# Patient Record
Sex: MALE | Race: WHITE | NOT HISPANIC OR LATINO | Employment: STUDENT | URBAN - METROPOLITAN AREA
[De-identification: names, ages, dates, MRNs, and addresses within clinical notes are randomized per-mention and may not be internally consistent; named-entity substitution may affect disease eponyms.]

---
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
## 2021-09-29 ENCOUNTER — OFFICE VISIT (OUTPATIENT)
Dept: INTERNAL MEDICINE | Facility: CLINIC | Age: 21
End: 2021-09-29

## 2021-09-29 VITALS
HEIGHT: 70 IN | WEIGHT: 213.8 LBS | DIASTOLIC BLOOD PRESSURE: 80 MMHG | OXYGEN SATURATION: 98 % | BODY MASS INDEX: 30.61 KG/M2 | SYSTOLIC BLOOD PRESSURE: 124 MMHG | HEART RATE: 92 BPM

## 2021-09-29 DIAGNOSIS — R52 BODY ACHES: Primary | ICD-10-CM

## 2021-09-29 DIAGNOSIS — H65.01 NON-RECURRENT ACUTE SEROUS OTITIS MEDIA OF RIGHT EAR: ICD-10-CM

## 2021-09-29 LAB
EXPIRATION DATE: NORMAL
FLUAV AG NPH QL: NEGATIVE
FLUBV AG NPH QL: NEGATIVE
INTERNAL CONTROL: NORMAL
Lab: 2780

## 2021-09-29 PROCEDURE — 87804 INFLUENZA ASSAY W/OPTIC: CPT

## 2021-09-29 PROCEDURE — 99203 OFFICE O/P NEW LOW 30 MIN: CPT

## 2021-09-29 PROCEDURE — U0004 COV-19 TEST NON-CDC HGH THRU: HCPCS

## 2021-09-29 RX ORDER — FLUTICASONE PROPIONATE 50 MCG
2 SPRAY, SUSPENSION (ML) NASAL DAILY
Qty: 16 G | Refills: 2 | Status: SHIPPED | OUTPATIENT
Start: 2021-09-29 | End: 2022-03-07

## 2021-09-29 RX ORDER — AMOXICILLIN AND CLAVULANATE POTASSIUM 875; 125 MG/1; MG/1
1 TABLET, FILM COATED ORAL 2 TIMES DAILY
Qty: 14 TABLET | Refills: 0 | Status: SHIPPED | OUTPATIENT
Start: 2021-09-29 | End: 2021-10-08

## 2021-09-29 NOTE — PROGRESS NOTES
Chief Complaint  Establish Care, Generalized Body Aches (for about 7 days), Fatigue, and Earache (right)    Justice Pierce presents to [facility]    Previous PCP: None     HPI: Body aches & fatigue x 6 days. Body aches are generalized and occur intermittently. Has been taking ibuprofen and acetaminophen each day with some moderate improvement. Pain is rated as an 8 out of 10. Completed COVID-19 vaccination series in April.     Right ear pain x 2 days. Symptoms have worsened as of this morning. Right ear feels achy and sharp. Reports that the ibuprofen and acetaminophen he has been taking for his body aches have helped with the ear pain. States that the pain is an 8 out of 10.       Subjective         Health Maintenance   Topic   • ANNUAL PHYSICAL    • HPV VACCINES (1 - Male 2-dose series)   • TDAP/TD VACCINES (1 - Tdap)   • HEPATITIS C SCREENING    • INFLUENZA VACCINE    • COVID-19 Vaccine    • Pneumococcal Vaccine 0-64    • MENINGOCOCCAL VACCINE        [facility]  The following portions of the patient's history were reviewed and updated as appropriate: allergies, current medications, past family history, past medical history, past social history, past surgical history and problem list.     History reviewed. No pertinent past medical history.   No Known Allergies   Social History     Tobacco Use   • Smoking status: Never Smoker   • Smokeless tobacco: Never Used   Substance Use Topics   • Alcohol use: Never   • Drug use: Never     History reviewed. No pertinent surgical history.   Family History   Problem Relation Age of Onset   • Liver disease Mother    • Cancer Mother    • Migraines Father    • Migraines Sister          Current Outpatient Medications:   •  amoxicillin-clavulanate (Augmentin) 875-125 MG per tablet, Take 1 tablet by mouth 2 (Two) Times a Day., Disp: 14 tablet, Rfl: 0  •  fluticasone (Flonase) 50 MCG/ACT nasal spray, 2 sprays into the nostril(s) as directed by provider  "Daily., Disp: 16 g, Rfl: 2    Review of Systems   Constitutional: Positive for fatigue. Negative for chills and fever.   HENT: Positive for ear pain. Negative for congestion, dental problem, drooling, ear discharge, facial swelling, hearing loss, mouth sores, nosebleeds, postnasal drip, rhinorrhea, sinus pressure, sneezing, sore throat, swollen glands, tinnitus, trouble swallowing and voice change.    Respiratory: Negative for apnea, cough, choking, chest tightness, shortness of breath, wheezing and stridor.    Cardiovascular: Negative for chest pain, palpitations and leg swelling.   Gastrointestinal: Negative for abdominal distention, abdominal pain, anal bleeding, blood in stool, constipation, diarrhea, nausea, rectal pain, vomiting, GERD and indigestion.   Musculoskeletal: Positive for arthralgias and myalgias. Negative for back pain, gait problem, joint swelling, neck pain, neck stiffness and bursitis.   Skin: Negative for color change, dry skin, pallor, rash, skin lesions and bruise.       Objective   Vital Signs  /80   Pulse 92   Ht 177.8 cm (70\")   Wt 97 kg (213 lb 12.8 oz)   SpO2 98% Comment: ra  BMI 30.68 kg/m²     Physical Exam  Constitutional:       Appearance: Normal appearance.   HENT:      Head: Normocephalic.      Right Ear: Hearing, ear canal and external ear normal. No decreased hearing noted. No laceration, drainage, swelling or tenderness. No middle ear effusion. There is no impacted cerumen. No foreign body. No mastoid tenderness. No PE tube. No hemotympanum. Tympanic membrane is erythematous and bulging. Tympanic membrane is not injected, scarred, perforated or retracted. Tympanic membrane has decreased mobility.      Left Ear: Hearing, tympanic membrane, ear canal and external ear normal.      Nose:      Right Turbinates: Swollen and pale.      Left Turbinates: Swollen and pale.      Mouth/Throat:      Lips: No lesions.      Mouth: Mucous membranes are moist.      Pharynx: Oropharynx " is clear.   Eyes:      Extraocular Movements: Extraocular movements intact.      Conjunctiva/sclera: Conjunctivae normal.      Pupils: Pupils are equal, round, and reactive to light.   Cardiovascular:      Rate and Rhythm: Normal rate and regular rhythm.      Pulses: Normal pulses.      Heart sounds: Normal heart sounds.   Pulmonary:      Effort: Pulmonary effort is normal.      Breath sounds: Normal breath sounds.   Abdominal:      General: Bowel sounds are normal.      Palpations: Abdomen is soft.   Skin:     General: Skin is warm and dry.      Capillary Refill: Capillary refill takes less than 2 seconds.   Neurological:      Mental Status: He is alert and oriented to person, place, and time.   Psychiatric:         Mood and Affect: Mood normal.         Thought Content: Thought content normal.         Judgment: Judgment normal.          Result Review :     The following data was reviewed by: NONA Powers on 09/29/2021:    Influenza negative in office    Assessment and Plan    1. Body aches  - POC Influenza A / B  - COVID-19 PCR, [facility], NP SWAB IN [facility]SPORT MEDIA/ORAL SWISH 24-30 HR TAT - Swab, Nasopharynx  - Influenza negative in office.   - Encouraged patient to self-quarantine until results are made available.  - Encouraged adequate rest, humidification, and increase clear fluid intake.   - Encouraged alternating use of acetaminophen and Ibuprofen every 4-6 hours as needed for headache, body aches, and/or fever reduction    2. Non-recurrent acute serous otitis media of right ear  - amoxicillin-clavulanate (Augmentin) 875-125 MG per tablet; Take 1 tablet by mouth 2 (Two) Times a Day.  Dispense: 14 tablet; Refill: 0  - fluticasone (Flonase) 50 MCG/ACT nasal spray; 2 sprays into the nostril(s) as directed by provider Daily.  Dispense: 16 g; Refill: 2      Follow Up     Return in about 4 weeks (around 10/27/2021) for Annual.    Patient was given instructions and counseling regarding his  condition or for health maintenance advice. Please see specific information pulled into the AVS if appropriate.    Part of this note may be an electronic transcription/translation of spoken language to printed text using the Dragon Dictation System.    Electronically signed by:   NONA Powers  09/29/2021

## 2021-09-30 LAB — SARS-COV-2 RNA NOSE QL NAA+PROBE: NOT DETECTED

## 2021-10-08 ENCOUNTER — OFFICE VISIT (OUTPATIENT)
Dept: INTERNAL MEDICINE | Facility: CLINIC | Age: 21
End: 2021-10-08

## 2021-10-08 VITALS
TEMPERATURE: 97.3 F | DIASTOLIC BLOOD PRESSURE: 64 MMHG | HEIGHT: 70 IN | SYSTOLIC BLOOD PRESSURE: 98 MMHG | HEART RATE: 76 BPM | BODY MASS INDEX: 30.35 KG/M2 | OXYGEN SATURATION: 98 % | WEIGHT: 212 LBS

## 2021-10-08 DIAGNOSIS — J30.9 ALLERGIC RHINITIS, UNSPECIFIED SEASONALITY, UNSPECIFIED TRIGGER: ICD-10-CM

## 2021-10-08 DIAGNOSIS — J02.9 SORE THROAT: Primary | ICD-10-CM

## 2021-10-08 LAB
EXPIRATION DATE: NORMAL
INTERNAL CONTROL: NORMAL
Lab: NORMAL
S PYO AG THROAT QL: NEGATIVE

## 2021-10-08 PROCEDURE — 87880 STREP A ASSAY W/OPTIC: CPT

## 2021-10-08 PROCEDURE — 99213 OFFICE O/P EST LOW 20 MIN: CPT

## 2021-10-08 RX ORDER — METHYLPREDNISOLONE 4 MG/1
TABLET ORAL
Qty: 21 TABLET | Refills: 0 | Status: SHIPPED | OUTPATIENT
Start: 2021-10-08 | End: 2021-10-13

## 2021-10-08 RX ORDER — CETIRIZINE HYDROCHLORIDE 10 MG/1
10 TABLET ORAL DAILY
Qty: 30 TABLET | Refills: 5 | Status: SHIPPED | OUTPATIENT
Start: 2021-10-08 | End: 2022-06-29

## 2021-10-08 NOTE — PROGRESS NOTES
Chief Complaint  Earache (Left), Sore Throat, and Nasal Congestion    Justice Pierce presents to [facility]    HPI: Was treated for AOM of the left ear with Augmentin on 9/29. Reports that symptoms improved after about 3 days of antibiotic therapy. No longer experiencing body aches. Reports that he is still experiencing nasal congestion, sore throat, and earache on the left. Sore throat is described as scratchy and achy.  Ear pain is described as full and achy. Has been using fluticasone with mild relief in symptoms. Reports that symptoms get worse when he comes home from college and comes to his parents who have a cat and dog. Ear pain is rated as a 6 out of 10.     Subjective         Health Maintenance   Topic   • ANNUAL PHYSICAL    • HPV VACCINES (1 - Male 2-dose series)   • TDAP/TD VACCINES (1 - Tdap)   • INFLUENZA VACCINE    • HEPATITIS C SCREENING    • COVID-19 Vaccine    • Pneumococcal Vaccine 0-64    • MENINGOCOCCAL VACCINE        [facility]  The following portions of the patient's history were reviewed and updated as appropriate: allergies, current medications, past family history, past medical history, past social history, past surgical history and problem list.     No past medical history on file.   No Known Allergies   Social History     Tobacco Use   • Smoking status: Never Smoker   • Smokeless tobacco: Never Used   Substance Use Topics   • Alcohol use: Never   • Drug use: Never     No past surgical history on file.   Family History   Problem Relation Age of Onset   • Liver disease Mother    • Cancer Mother    • Migraines Father    • Migraines Sister          Current Outpatient Medications:   •  fluticasone (Flonase) 50 MCG/ACT nasal spray, 2 sprays into the nostril(s) as directed by provider Daily., Disp: 16 g, Rfl: 2  •  cetirizine (zyrTEC) 10 MG tablet, Take 1 tablet by mouth Daily., Disp: 30 tablet, Rfl: 5  •  methylPREDNISolone (MEDROL) 4 MG dose pack, Take as directed  "on package instructions., Disp: 21 tablet, Rfl: 0    Review of Systems   HENT: Positive for congestion, ear pain, postnasal drip and sore throat. Negative for dental problem, drooling, ear discharge, facial swelling, hearing loss, mouth sores, nosebleeds, rhinorrhea, sinus pressure, sneezing, swollen glands, tinnitus, trouble swallowing and voice change.    Respiratory: Negative for apnea, cough, choking, chest tightness, shortness of breath, wheezing and stridor.    Cardiovascular: Negative for chest pain, palpitations and leg swelling.       Objective   Vital Signs  BP 98/64   Pulse 76   Temp 97.3 °F (36.3 °C)   Ht 177.8 cm (70\")   Wt 96.2 kg (212 lb)   SpO2 98%   BMI 30.42 kg/m²     Physical Exam  Constitutional:       Appearance: Normal appearance.   HENT:      Head: Normocephalic.      Right Ear: Hearing, tympanic membrane, ear canal and external ear normal.      Left Ear: Hearing, ear canal and external ear normal. A middle ear effusion is present.      Nose:      Right Turbinates: Swollen and pale.      Left Turbinates: Swollen and pale.      Mouth/Throat:      Mouth: Mucous membranes are moist.      Pharynx: Oropharynx is clear.   Eyes:      General: Allergic shiner present.      Extraocular Movements: Extraocular movements intact.      Conjunctiva/sclera: Conjunctivae normal.      Pupils: Pupils are equal, round, and reactive to light.   Cardiovascular:      Rate and Rhythm: Normal rate and regular rhythm.      Pulses: Normal pulses.      Heart sounds: Normal heart sounds.   Pulmonary:      Effort: Pulmonary effort is normal.      Breath sounds: Normal breath sounds.   Skin:     General: Skin is warm and dry.      Capillary Refill: Capillary refill takes less than 2 seconds.   Neurological:      Mental Status: He is alert and oriented to person, place, and time.   Psychiatric:         Mood and Affect: Mood normal.         Behavior: Behavior normal.         Thought Content: Thought content normal.    "      Judgment: Judgment normal.          Result Review :     The following data was reviewed by: NONA Powers on 10/08/2021:     Strep test negative in office.     Assessment and Plan    1. Sore throat  - POCT rapid strep A  - Strep negative in office.   - Encouraged prn salt water gargles for symptomatic relief.   2. Allergic rhinitis, unspecified seasonality, unspecified trigger  - cetirizine (zyrTEC) 10 MG tablet; Take 1 tablet by mouth Daily.  Dispense: 30 tablet; Refill: 5  - methylPREDNISolone (MEDROL) 4 MG dose pack; Take as directed on package instructions.  Dispense: 21 tablet; Refill: 0  - Discussed how symptoms are likely due to allergies r/t pet dander.   - Continue with fluticasone propionate 2 sprays per nostril once daily; after the first few days dosage may be reduced to 1 spray per nostril once daily for maintenance.  Squirt nasal spray just inside the nostril aiming toward the outer walls on both sides.  Do not take a deep breath or sniff hard after spraying as this will result in the medication draining down the throat.    - Initiate zyrtec once per day as prescribed.   - Encouraged patient to avoid dust mites, pollens, animal dander, cockroaches, indoor molds, rodents, and upholstery.    - Discussed if removing a pet from home is not feasible, isolating the pet to one room in the house may be an option to minimize dander exposure.  It may take up to 20 weeks eliminate cat dander from home even after removing the animal.    - Encouraged the use of allergen impermeable bedding covers, washing sheets in hot water, and the use of a vacuum  with high efficiency particulate air (HEPA) filters may also lessen symptoms. Take a shower before bed to rinse pollen off the hair and skin.       Follow Up     Return for Next scheduled follow up.    Patient was given instructions and counseling regarding his condition or for health maintenance advice. Please see specific information pulled into the  AVS if appropriate.    Part of this note may be an electronic transcription/translation of spoken language to printed text using the Dragon Dictation System.    Electronically signed by:   NONA Powers  10/08/2021

## 2021-10-13 ENCOUNTER — LAB (OUTPATIENT)
Dept: LAB | Facility: HOSPITAL | Age: 21
End: 2021-10-13

## 2021-10-13 ENCOUNTER — OFFICE VISIT (OUTPATIENT)
Dept: INTERNAL MEDICINE | Facility: CLINIC | Age: 21
End: 2021-10-13

## 2021-10-13 VITALS
DIASTOLIC BLOOD PRESSURE: 78 MMHG | WEIGHT: 212 LBS | OXYGEN SATURATION: 97 % | HEIGHT: 70 IN | TEMPERATURE: 97.3 F | HEART RATE: 85 BPM | BODY MASS INDEX: 30.35 KG/M2 | SYSTOLIC BLOOD PRESSURE: 104 MMHG

## 2021-10-13 DIAGNOSIS — J02.9 ACUTE PHARYNGITIS, UNSPECIFIED ETIOLOGY: Primary | ICD-10-CM

## 2021-10-13 DIAGNOSIS — B27.90 INFECTIOUS MONONUCLEOSIS WITHOUT COMPLICATION, INFECTIOUS MONONUCLEOSIS DUE TO UNSPECIFIED ORGANISM: ICD-10-CM

## 2021-10-13 DIAGNOSIS — R53.83 FATIGUE, UNSPECIFIED TYPE: ICD-10-CM

## 2021-10-13 DIAGNOSIS — J02.9 ACUTE PHARYNGITIS, UNSPECIFIED ETIOLOGY: ICD-10-CM

## 2021-10-13 LAB
HCV AB SER DONR QL: NORMAL
HETEROPH AB SER QL LA: POSITIVE

## 2021-10-13 PROCEDURE — 86308 HETEROPHILE ANTIBODY SCREEN: CPT

## 2021-10-13 PROCEDURE — 80053 COMPREHEN METABOLIC PANEL: CPT

## 2021-10-13 PROCEDURE — 85025 COMPLETE CBC W/AUTO DIFF WBC: CPT

## 2021-10-13 PROCEDURE — 36415 COLL VENOUS BLD VENIPUNCTURE: CPT

## 2021-10-13 PROCEDURE — 99214 OFFICE O/P EST MOD 30 MIN: CPT

## 2021-10-13 PROCEDURE — 86803 HEPATITIS C AB TEST: CPT

## 2021-10-13 PROCEDURE — 85007 BL SMEAR W/DIFF WBC COUNT: CPT

## 2021-10-13 PROCEDURE — 84443 ASSAY THYROID STIM HORMONE: CPT

## 2021-10-13 RX ORDER — CEFDINIR 300 MG/1
300 CAPSULE ORAL 2 TIMES DAILY
Qty: 20 CAPSULE | Refills: 0 | Status: SHIPPED | OUTPATIENT
Start: 2021-10-13 | End: 2021-10-14

## 2021-10-13 NOTE — PROGRESS NOTES
Chief Complaint  Sore Throat    Justice Pierce presents to [facility]    HPI: Sore throat and fatigue x 2 days. Symptoms have severely worsened since yesterday. Throat feels scratchy and achy. Has been using zyrtec and fluticasone without improvement in symptoms. Pain is rated as an 8 out of 10.       Subjective         [facility]  The following portions of the patient's history were reviewed and updated as appropriate: allergies, current medications, past family history, past medical history, past social history, past surgical history and problem list.     History reviewed. No pertinent past medical history.   No Known Allergies   Social History     Tobacco Use   • Smoking status: Never Smoker   • Smokeless tobacco: Never Used   Substance Use Topics   • Alcohol use: Never   • Drug use: Never     History reviewed. No pertinent surgical history.   Family History   Problem Relation Age of Onset   • Liver disease Mother    • Cancer Mother    • Migraines Father    • Migraines Sister          Current Outpatient Medications:   •  cetirizine (zyrTEC) 10 MG tablet, Take 1 tablet by mouth Daily., Disp: 30 tablet, Rfl: 5  •  fluticasone (Flonase) 50 MCG/ACT nasal spray, 2 sprays into the nostril(s) as directed by provider Daily., Disp: 16 g, Rfl: 2  •  cefdinir (OMNICEF) 300 MG capsule, Take 1 capsule by mouth 2 (Two) Times a Day for 10 days., Disp: 20 capsule, Rfl: 0    Review of Systems   Constitutional: Positive for fatigue. Negative for activity change, appetite change, chills, diaphoresis and fever.   HENT: Positive for sore throat. Negative for congestion, dental problem, drooling, ear discharge, ear pain, facial swelling, hearing loss, mouth sores, nosebleeds, postnasal drip, rhinorrhea, sinus pressure, sneezing, swollen glands, tinnitus, trouble swallowing and voice change.    Respiratory: Negative for apnea, cough, choking, chest tightness, shortness of breath, wheezing and stridor.   "  Cardiovascular: Negative for chest pain, palpitations and leg swelling.   Gastrointestinal: Negative for abdominal distention, abdominal pain, anal bleeding, blood in stool, constipation, diarrhea, nausea, rectal pain, vomiting, GERD and indigestion.   Skin: Negative for color change, dry skin, pallor, rash, skin lesions and bruise.       Objective   Vital Signs  /78   Pulse 85   Temp 97.3 °F (36.3 °C)   Ht 177.8 cm (70\")   Wt 96.2 kg (212 lb)   SpO2 97%   BMI 30.42 kg/m²     Physical Exam  Constitutional:       Appearance: Normal appearance.   HENT:      Head: Normocephalic.      Right Ear: Hearing, ear canal and external ear normal. A middle ear effusion is present.      Left Ear: Hearing, tympanic membrane, ear canal and external ear normal.      Nose:      Right Turbinates: Swollen and pale.      Left Turbinates: Swollen and pale.      Right Sinus: No maxillary sinus tenderness or frontal sinus tenderness.      Left Sinus: No maxillary sinus tenderness or frontal sinus tenderness.      Mouth/Throat:      Mouth: Mucous membranes are moist.      Pharynx: Oropharynx is clear. Posterior oropharyngeal erythema present. No pharyngeal swelling, oropharyngeal exudate or uvula swelling.   Eyes:      Extraocular Movements: Extraocular movements intact.      Conjunctiva/sclera: Conjunctivae normal.      Pupils: Pupils are equal, round, and reactive to light.   Cardiovascular:      Rate and Rhythm: Normal rate and regular rhythm.      Pulses: Normal pulses.      Heart sounds: Normal heart sounds.   Pulmonary:      Effort: Pulmonary effort is normal.      Breath sounds: Normal breath sounds.   Skin:     Capillary Refill: Capillary refill takes less than 2 seconds.   Neurological:      Mental Status: He is alert and oriented to person, place, and time.   Psychiatric:         Mood and Affect: Mood normal.         Behavior: Behavior normal.         Thought Content: Thought content normal.         Judgment: " Judgment normal.          Result Review :     The following data was reviewed by: NONA Powers on 10/13/2021:      Assessment and Plan {CC Problem List  Visit Diagnosis   ROS  Review (Popup)  Health Maintenance  Quality  BestPractice  Medications  SmartSets  SnapShot Encounters  Media :23}   1. Acute pharyngitis, unspecified etiology  - cefdinir (OMNICEF) 300 MG capsule; Take 1 capsule by mouth 2 (Two) Times a Day for 10 days.  Dispense: 20 capsule; Refill: 0  - Mononucleosis Screen; Future  - Encouraged Adequate rest, humidification, and increase clear fluid intake.   - Encouraged warm salt water gargles prn for symptomatic relief.   - Encouraged alternating use of acetaminophen and Ibuprofen every 4-6 hours as needed for headache, body aches, and/or fever reduction     2. Fatigue, unspecified type  - CBC & Differential; Future  - Comprehensive Metabolic Panel; Future  - TSH Rfx On Abnormal To Free T4; Future  - Hepatitis C Antibody; Future      Follow Up     Return for Next scheduled follow up.    Patient was given instructions and counseling regarding his condition or for health maintenance advice. Please see specific information pulled into the AVS if appropriate.    Part of this note may be an electronic transcription/translation of spoken language to printed text using the Dragon Dictation System.    Electronically signed by:   NONA Powers  10/13/2021

## 2021-10-14 ENCOUNTER — TELEPHONE (OUTPATIENT)
Dept: INTERNAL MEDICINE | Facility: CLINIC | Age: 21
End: 2021-10-14

## 2021-10-14 LAB
ALBUMIN SERPL-MCNC: 5 G/DL (ref 3.5–5.2)
ALBUMIN/GLOB SERPL: 1.7 G/DL
ALP SERPL-CCNC: 104 U/L (ref 39–117)
ALT SERPL W P-5'-P-CCNC: 80 U/L (ref 1–41)
ANION GAP SERPL CALCULATED.3IONS-SCNC: 12.6 MMOL/L (ref 5–15)
AST SERPL-CCNC: 25 U/L (ref 1–40)
BILIRUB SERPL-MCNC: 0.5 MG/DL (ref 0–1.2)
BUN SERPL-MCNC: 13 MG/DL (ref 6–20)
BUN/CREAT SERPL: 10.2 (ref 7–25)
CALCIUM SPEC-SCNC: 9.3 MG/DL (ref 8.6–10.5)
CHLORIDE SERPL-SCNC: 102 MMOL/L (ref 98–107)
CO2 SERPL-SCNC: 24.4 MMOL/L (ref 22–29)
CREAT SERPL-MCNC: 1.28 MG/DL (ref 0.76–1.27)
DEPRECATED RDW RBC AUTO: 41.3 FL (ref 37–54)
EOSINOPHIL # BLD MANUAL: 0.25 10*3/MM3 (ref 0–0.4)
EOSINOPHIL NFR BLD MANUAL: 2 % (ref 0.3–6.2)
ERYTHROCYTE [DISTWIDTH] IN BLOOD BY AUTOMATED COUNT: 13.2 % (ref 12.3–15.4)
GFR SERPL CREATININE-BSD FRML MDRD: 72 ML/MIN/1.73
GLOBULIN UR ELPH-MCNC: 3 GM/DL
GLUCOSE SERPL-MCNC: 72 MG/DL (ref 65–99)
HCT VFR BLD AUTO: 48.2 % (ref 37.5–51)
HGB BLD-MCNC: 16.6 G/DL (ref 13–17.7)
LYMPHOCYTES # BLD MANUAL: 7.86 10*3/MM3 (ref 0.7–3.1)
LYMPHOCYTES NFR BLD MANUAL: 4 % (ref 5–12)
LYMPHOCYTES NFR BLD MANUAL: 62 % (ref 19.6–45.3)
MCH RBC QN AUTO: 29.7 PG (ref 26.6–33)
MCHC RBC AUTO-ENTMCNC: 34.4 G/DL (ref 31.5–35.7)
MCV RBC AUTO: 86.2 FL (ref 79–97)
MONOCYTES # BLD AUTO: 0.51 10*3/MM3 (ref 0.1–0.9)
NEUTROPHILS # BLD AUTO: 4.06 10*3/MM3 (ref 1.7–7)
NEUTROPHILS NFR BLD MANUAL: 32 % (ref 42.7–76)
NRBC BLD AUTO-RTO: 0 /100 WBC (ref 0–0.2)
NRBC SPEC MANUAL: 1 /100 WBC (ref 0–0.2)
PLAT MORPH BLD: NORMAL
PLATELET # BLD AUTO: 413 10*3/MM3 (ref 140–450)
PMV BLD AUTO: 9.1 FL (ref 6–12)
POTASSIUM SERPL-SCNC: 4.5 MMOL/L (ref 3.5–5.2)
PROT SERPL-MCNC: 8 G/DL (ref 6–8.5)
RBC # BLD AUTO: 5.59 10*6/MM3 (ref 4.14–5.8)
RBC MORPH BLD: NORMAL
SODIUM SERPL-SCNC: 139 MMOL/L (ref 136–145)
TSH SERPL DL<=0.05 MIU/L-ACNC: 2.61 UIU/ML (ref 0.27–4.2)
WBC # BLD AUTO: 12.68 10*3/MM3 (ref 3.4–10.8)
WBC MORPH BLD: NORMAL

## 2021-10-14 NOTE — TELEPHONE ENCOUNTER
----- Message from NONA Cooley sent at 10/14/2021  7:34 AM EDT -----  Please call Mr. Pierce and let him know that his thyroid function, fasting glucose, and electrolytes were within normal ranges. Hepatitis C was negative. His monospot was positive and the abnormalities on his CBC indicate that he has Infectious Mononucleosis which would explain his current symptoms. One of his liver enzymes was elevated which is also likely due to Mono. His creatinine was mildly elevated so please ensure he is drinking 5-6 bottles of water per day which will also be important in the symptomatic treatment of mono. He can stop the Cefdinir as Mono is caused by a virus (EBV). Mono is treated symptomatically. He can alternate tylenol and ibuprofen for the pain with sore throat, he needs to drink plenty of fluids, and ensure that he is able to get adequate rest. He will need to avoid participating in any kind of contact sports as the spleen can enlarge during mono and can sometimes rupture which is most likely within the first 2-21 days of symptoms. Most of his symptoms should generally resolve with symptomatic care in 1-2 weeks although the fatigue can sometimes persist for longer. Please let me know if he has any questions.

## 2021-11-06 ENCOUNTER — OFFICE VISIT (OUTPATIENT)
Dept: INTERNAL MEDICINE | Facility: CLINIC | Age: 21
End: 2021-11-06

## 2021-11-06 VITALS
BODY MASS INDEX: 30.06 KG/M2 | DIASTOLIC BLOOD PRESSURE: 72 MMHG | WEIGHT: 210 LBS | HEART RATE: 89 BPM | TEMPERATURE: 98.1 F | SYSTOLIC BLOOD PRESSURE: 118 MMHG | HEIGHT: 70 IN | OXYGEN SATURATION: 97 %

## 2021-11-06 DIAGNOSIS — F41.8 TEST ANXIETY: ICD-10-CM

## 2021-11-06 DIAGNOSIS — Z00.00 PHYSICAL EXAM: Primary | ICD-10-CM

## 2021-11-06 DIAGNOSIS — Z23 NEED FOR INFLUENZA VACCINATION: ICD-10-CM

## 2021-11-06 PROCEDURE — 90686 IIV4 VACC NO PRSV 0.5 ML IM: CPT

## 2021-11-06 PROCEDURE — 90471 IMMUNIZATION ADMIN: CPT

## 2021-11-06 PROCEDURE — 99395 PREV VISIT EST AGE 18-39: CPT

## 2021-11-06 NOTE — PROGRESS NOTES
"Chief Complaint  Annual Exam (Patient has no complaints)    Justice Pierce presents to [facility]     Test anxiety- Feels like he gets very anxious before a test. Experiences tachycardia, diaphoresis, SOA, and intense fear of failing. Interested in counseling and not medications at this time.     The patient is being seen for a health maintenance evaluation.  The last health maintenance was >5 year(s) ago.    Social History  Justice  does not smoke cigarettes.   He drinks no alcohol.  He does not use illicit drugs.    General History  Justice does have regular dental visits.  He does complain of vision problems. Reports bright lights can cause headaches. Last eye exam was 2021.   Immunizations are not up to date. The patient needs the following immunizations: Influenza     Lifestyle  Justice  consumes a in general, a \"healthy\" diet  .  He exercises three times a week.    Reproductive Health  Justice  is not sexually active. His contraceptive plan is condoms, oral contraceptives (estrogen/progesterone).   He does not have erectile dysfunction.     Screening  Last PSA was n/a.  Last prostate exam was n/a. Family history of prostate cancer: No.   Last testicular exam was self-performed monthly with no current concerns.   Last colonoscopy n/a. Family history of colon cancer: No.   Other pertinent family history and/or screenings:    Health Maintenance -   Wearing seatbelt: yes  Smoke detectors in home: yes    Patient denies fever, chills, headache, ear pain, sore throat, shortness of air, cough, wheezing, chest pain, palpitations, abdominal pain, nausea, vomiting, diarrhea, dysuria, blood in stool or urine. Mood is stable. Eating and drinking as usual. No unexplained weight loss or gain.       Subjective         Health Maintenance   Topic   • HPV VACCINES (1 - Male 2-dose series)   • ANNUAL PHYSICAL    • TDAP/TD VACCINES (2 - Td or Tdap)   • HEPATITIS C SCREENING    • COVID-19 Vaccine    • " INFLUENZA VACCINE    • Pneumococcal Vaccine 0-64    • MENINGOCOCCAL VACCINE        [facility]  The following portions of the patient's history were reviewed and updated as appropriate: allergies, current medications, past family history, past medical history, past social history, past surgical history and problem list.     Past Medical History:   Diagnosis Date   • Allergic       No Known Allergies   Social History     Tobacco Use   • Smoking status: Never Smoker   • Smokeless tobacco: Never Used   Substance Use Topics   • Alcohol use: Never   • Drug use: Never     History reviewed. No pertinent surgical history.   Family History   Problem Relation Age of Onset   • Liver disease Mother    • Cancer Mother    • Migraines Father    • Migraines Sister          Current Outpatient Medications:   •  cetirizine (zyrTEC) 10 MG tablet, Take 1 tablet by mouth Daily., Disp: 30 tablet, Rfl: 5  •  fluticasone (Flonase) 50 MCG/ACT nasal spray, 2 sprays into the nostril(s) as directed by provider Daily., Disp: 16 g, Rfl: 2    Review of Systems   Constitutional: Negative for appetite change, chills, diaphoresis, fatigue, fever and unexpected weight loss.   HENT: Negative for nosebleeds, rhinorrhea, sinus pressure, sore throat, swollen glands, trouble swallowing and voice change.    Eyes: Negative for blurred vision, double vision, photophobia and visual disturbance.   Respiratory: Negative for apnea, cough, chest tightness, shortness of breath and wheezing.    Cardiovascular: Negative for chest pain, palpitations and leg swelling.   Gastrointestinal: Negative for abdominal pain, blood in stool, constipation, diarrhea, nausea, vomiting and GERD.   Genitourinary: Negative for decreased urine volume, difficulty urinating, dysuria and flank pain.   Musculoskeletal: Negative for arthralgias and myalgias.   Skin: Negative for color change, rash and skin lesions.   Neurological: Negative for dizziness, syncope, facial asymmetry, speech  "difficulty, weakness, light-headedness and headache.   Psychiatric/Behavioral: Negative for behavioral problems, dysphoric mood, hallucinations, self-injury, sleep disturbance, suicidal ideas and depressed mood. The patient is nervous/anxious.        Objective   Vital Signs  /72   Pulse 89   Temp 98.1 °F (36.7 °C)   Ht 177.8 cm (70\")   Wt 95.3 kg (210 lb)   SpO2 97%   BMI 30.13 kg/m²     Physical Exam  Constitutional:       General: He is not in acute distress.     Appearance: Normal appearance. He is normal weight. He is not ill-appearing or toxic-appearing.   HENT:      Head: Normocephalic.      Right Ear: Hearing, tympanic membrane, ear canal and external ear normal.      Left Ear: Hearing, tympanic membrane, ear canal and external ear normal.      Nose: Nose normal. No congestion or rhinorrhea.      Mouth/Throat:      Mouth: Mucous membranes are moist.      Pharynx: Oropharynx is clear. No oropharyngeal exudate or posterior oropharyngeal erythema.   Eyes:      General: Lids are normal. No allergic shiner, visual field deficit or scleral icterus.     Extraocular Movements: Extraocular movements intact.      Right eye: No nystagmus.      Left eye: No nystagmus.      Conjunctiva/sclera: Conjunctivae normal.      Pupils: Pupils are equal, round, and reactive to light.   Neck:      Thyroid: No thyroid mass, thyromegaly or thyroid tenderness.      Vascular: No carotid bruit.      Trachea: Trachea and phonation normal.   Cardiovascular:      Rate and Rhythm: Normal rate and regular rhythm.      Chest Wall: PMI is not displaced. No thrill.      Pulses: Normal pulses.           Radial pulses are 2+ on the right side and 2+ on the left side.        Dorsalis pedis pulses are 2+ on the right side and 2+ on the left side.        Posterior tibial pulses are 2+ on the right side and 2+ on the left side.      Heart sounds: No murmur heard.  No gallop. No S3 sounds.    Pulmonary:      Effort: Pulmonary effort is " normal.      Breath sounds: Normal breath sounds.   Chest:   Breasts:      Right: No axillary adenopathy or supraclavicular adenopathy.      Left: No axillary adenopathy or supraclavicular adenopathy.       Abdominal:      General: Abdomen is flat. Bowel sounds are normal.      Palpations: Abdomen is soft.      Tenderness: There is no abdominal tenderness. There is no guarding or rebound.      Hernia: No hernia is present.   Musculoskeletal:         General: Normal range of motion.      Cervical back: Normal range of motion and neck supple. No rigidity.      Right lower leg: No edema.      Left lower leg: No edema.   Lymphadenopathy:      Head:      Right side of head: No submental, submandibular, tonsillar, preauricular, posterior auricular or occipital adenopathy.      Left side of head: No submental, submandibular, tonsillar, preauricular, posterior auricular or occipital adenopathy.      Cervical: No cervical adenopathy.      Upper Body:      Right upper body: No supraclavicular, axillary, pectoral or epitrochlear adenopathy.      Left upper body: No supraclavicular, axillary, pectoral or epitrochlear adenopathy.   Skin:     General: Skin is warm and dry.      Capillary Refill: Capillary refill takes less than 2 seconds.      Findings: No abrasion.      Nails: There is no clubbing.   Neurological:      General: No focal deficit present.      Mental Status: He is alert and oriented to person, place, and time.      GCS: GCS eye subscore is 4. GCS verbal subscore is 5. GCS motor subscore is 6.      Cranial Nerves: Cranial nerves are intact.      Motor: Motor function is intact.      Coordination: Coordination is intact.      Deep Tendon Reflexes: Reflexes are normal and symmetric.   Psychiatric:         Attention and Perception: Attention and perception normal.         Mood and Affect: Mood and affect normal.         Speech: Speech normal.         Behavior: Behavior normal. Behavior is cooperative.         Thought  Content: Thought content normal.         Cognition and Memory: Cognition and memory normal.         Judgment: Judgment normal.          Result Review :     The following data was reviewed by: NONA Powers on 11/06/2021:  Common labs    Common Labsle 10/13/21 10/13/21    1506 1506   Glucose 72    BUN 13    Creatinine 1.28 (A)    eGFR Non African Am 72    Sodium 139    Potassium 4.5    Chloride 102    Calcium 9.3    Albumin 5.00    Total Bilirubin 0.5    Alkaline Phosphatase 104    AST (SGOT) 25    ALT (SGPT) 80 (A)    WBC  12.68 (A)   Hemoglobin  16.6   Hematocrit  48.2   Platelets  413   (A) Abnormal value              Assessment and Plan    1. Physical exam  -Nutrition and activity goals reviewed including: mainly water to drink, limit white flour/processed sugar; increase high protein, high fiber carbs, good breakfast, working toward 150 mins cardio per week, resistance training 2x/week.    The patient is here for a health maintenance visit.  Screening lab work is ordered.  Immunizations are reported as current.  Advice and education is given regarding nutrition, aerobic exercise, routine dental evaluations, routine eye exams, reproductive health, cardiovascular risk reduction.  Further recommendations after lab evaluation.  Annual wellness evaluations recommended.     I discussed the patients findings and my recommendations with patient.  Patient was encouraged to keep me informed of any acute changes or any new concerning symptoms.  Patient voiced understanding of all instructions and denied further questions.    2. Test anxiety  - Ambulatory Referral to Behavioral Health    3. Need for influenza vaccination  - FluLaval/Fluarix/Fluzone >6 Months    Follow Up     Return in about 1 year (around 11/6/2022) for Annual.    Patient was given instructions and counseling regarding his condition or for health maintenance advice. Please see specific information pulled into the AVS if appropriate.    Part of this note  may be an electronic transcription/translation of spoken language to printed text using the Dragon Dictation System.    Electronically signed by:   NONA Powers  11/06/2021

## 2021-11-16 ENCOUNTER — OFFICE VISIT (OUTPATIENT)
Dept: BEHAVIORAL HEALTH | Facility: CLINIC | Age: 21
End: 2021-11-16

## 2021-11-16 VITALS
DIASTOLIC BLOOD PRESSURE: 76 MMHG | BODY MASS INDEX: 30.41 KG/M2 | WEIGHT: 212.4 LBS | HEIGHT: 70 IN | SYSTOLIC BLOOD PRESSURE: 120 MMHG

## 2021-11-16 DIAGNOSIS — F33.0 MILD EPISODE OF RECURRENT MAJOR DEPRESSIVE DISORDER (HCC): ICD-10-CM

## 2021-11-16 DIAGNOSIS — F41.8 SITUATIONAL ANXIETY: Primary | ICD-10-CM

## 2021-11-16 DIAGNOSIS — F41.1 GENERALIZED ANXIETY DISORDER: ICD-10-CM

## 2021-11-16 PROCEDURE — 90792 PSYCH DIAG EVAL W/MED SRVCS: CPT

## 2021-11-16 RX ORDER — PROPRANOLOL HYDROCHLORIDE 10 MG/1
10-20 TABLET ORAL 3 TIMES DAILY PRN
Qty: 60 TABLET | Refills: 1 | Status: SHIPPED | OUTPATIENT
Start: 2021-11-16 | End: 2022-12-05

## 2021-11-16 NOTE — PROGRESS NOTES
New Patient Office Visit      Patient Name: Justice Pierce  : 2000   MRN: [id number]     Referring Provider: Julio Cesar Garcia APRN    Chief Complaint: Psychiatric evaluation related to:    ICD-10-CM ICD-9-CM   1. Situational anxiety  F41.8 300.09   2. Generalized anxiety disorder  F41.1 300.02   3. Mild episode of recurrent major depressive disorder (HCC)  F33.0 296.31        History of Present Illness:   Justice Pierce is a 20 y.o. male who is here today for psychiatric evaluation and medication management on referral from primary care provider.  The patient reports that since approximately freshman year of high school he has had significant performance/test anxiety in which she gets shaky, feels hot, has heightened anxiety, chest tightness and heart racing while taking exams.  The patient reports that he believes this is related to feeling significant pressure to perform in comparing himself to his older brother and father.  The patient reports he has dealt with significant self-confidence issues and is scared of failure, and feels like he does not fit in with his peers.  The patient endorses some symptoms of depression including low moods/sadness several days per week, loneliness.  The patient believes that the symptoms started following the death of his mother when he was about 8 years old.      Subjective      Review of Systems:   Review of Systems   Psychiatric/Behavioral: Positive for dysphoric mood and stress. Negative for self-injury and suicidal ideas. The patient is nervous/anxious.        PHQ-9 Depression Screening Score: 16     Past Psychiatric History:   No history of inpatient admissions or suicidal attempts.  The patient saw a therapist through hospice care when his mother .    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  The patient does well academically maintaining all A's and B's.  The patient feels as if these are  "unacceptable.  The patient is the youngest of 4 siblings.  The patient reports comparing himself to his older brother who is \"good everything\".  The patient also admires his dad who is a .  The patient reports a tightknit family between him and his 4 siblings and expresses sadness that his family is getting older and moving on with their lives.  He is closest to his sister Alcie who is 24 years old and recently moved out of the house which was hard for him.  He lives in the dorms during the week and returns home to [place] on the weekends to see his family.  The patient is in a relationship with his girlfriend who he sees once a week.    Past Medical History:   Past Medical History:   Diagnosis Date   • Allergic        Past Surgical History: History reviewed. No pertinent surgical history.    Family History:   Family History   Problem Relation Age of Onset   • Liver disease Mother    • Cancer Mother    • Migraines Father    • Migraines Sister        Family Psychiatric History:  Patient reports that his siblings have dealt with depression and anxiety since his mother .    Medications:     Current Outpatient Medications:   •  cetirizine (zyrTEC) 10 MG tablet, Take 1 tablet by mouth Daily., Disp: 30 tablet, Rfl: 5  •  fluticasone (Flonase) 50 MCG/ACT nasal spray, 2 sprays into the nostril(s) as directed by provider Daily., Disp: 16 g, Rfl: 2  •  propranolol (INDERAL) 10 MG tablet, Take 1-2 tablets by mouth 3 (Three) Times a Day As Needed (for situational anxiety)., Disp: 60 tablet, Rfl: 1    Allergies:   No Known Allergies      Objective     Physical Exam:  Vital Signs:   Vitals:    21 1533 21 1534   BP:  120/76  Comment: 82   Weight: 96.3 kg (212 lb 6.4 oz)    Height: 177.8 cm (70\")      Body mass index is 30.48 kg/m².     Physical Exam    Mental Status Exam:   Appearance: Well nourished, well kept, dressed appropriately to weather and situation  Hygiene: Well kept  Cooperation: " "Good  Eye Contact: Good  Psychomotor Behavior: Normal  Mood: Sad/down \"more days than not\"  Affect: Full range of affect, pleasant  Speech: Normal rate, tone and prosody  Thought Process: Linear, goal-directed  Thought Content: Worry/anxiety  Suicidal: Denies  Homicidal: Denies  Hallucinations: Denies  Delusion: Denies  Memory: Normal  Orientation: X4  Reliability: Good  Insight: Good  Judgement: Good  Impulse Control: Good    Assessment / Plan      Visit Diagnosis/Orders Placed This Visit:  Diagnoses and all orders for this visit:    1. Situational anxiety (Primary)  -     propranolol (INDERAL) 10 MG tablet; Take 1-2 tablets by mouth 3 (Three) Times a Day As Needed (for situational anxiety).  Dispense: 60 tablet; Refill: 1    2. Generalized anxiety disorder    3. Mild episode of recurrent major depressive disorder (HCC)         Differential:   None current    Plan:   1. Start propranolol 10 mg 1 to 2 tablets up to 3 times daily as needed  2. Start psychotherapy at next appointment    Continue supportive psychotherapy efforts and medications as indicated. Treatment and medication options discussed during today's visit. Patient ackowledged and verbally consented to continue with current treatment plan and was educated on the importance of compliance with treatment and follow-up appointments. Patient seems reasonably able to adhere to treatment plan.      Medication Considerations:  Discussed medication options and treatment plan of prescribed medication(s) as well as the risks, benefits, and potential side effects. Patient is agreeable to call the office with any worsening of symptoms or onset of side effects. Patient is agreeable to call 911 or go to the nearest ER should he/she begin having SI/HI.    Treatment Goals:    Patient will experience relief from performance/test anxiety with pharmacological management.  The patient will begin to explore issues related to self-confidence, image and performance anxiety in " psychotherapy.    Quality Measures:   I advised Justice of the risks of tobacco use.     Follow Up:   Return in 1 week (on 11/23/2021) for Therapy Follow Up, Video visit.      NONA Morris, PMHNP-BC

## 2021-11-21 PROCEDURE — 87661 TRICHOMONAS VAGINALIS AMPLIF: CPT | Performed by: NURSE PRACTITIONER

## 2021-11-21 PROCEDURE — 87086 URINE CULTURE/COLONY COUNT: CPT | Performed by: NURSE PRACTITIONER

## 2021-11-21 PROCEDURE — 87591 N.GONORRHOEAE DNA AMP PROB: CPT | Performed by: NURSE PRACTITIONER

## 2021-11-21 PROCEDURE — 87491 CHLMYD TRACH DNA AMP PROBE: CPT | Performed by: NURSE PRACTITIONER

## 2021-11-23 ENCOUNTER — TELEMEDICINE (OUTPATIENT)
Dept: BEHAVIORAL HEALTH | Facility: CLINIC | Age: 21
End: 2021-11-23

## 2021-11-23 ENCOUNTER — OFFICE VISIT (OUTPATIENT)
Dept: INTERNAL MEDICINE | Facility: CLINIC | Age: 21
End: 2021-11-23

## 2021-11-23 DIAGNOSIS — F33.0 MILD EPISODE OF RECURRENT MAJOR DEPRESSIVE DISORDER (HCC): Primary | ICD-10-CM

## 2021-11-23 DIAGNOSIS — F41.1 GENERALIZED ANXIETY DISORDER: ICD-10-CM

## 2021-11-23 DIAGNOSIS — F41.8 SITUATIONAL ANXIETY: ICD-10-CM

## 2021-11-23 DIAGNOSIS — R35.0 URINARY FREQUENCY: Primary | ICD-10-CM

## 2021-11-23 DIAGNOSIS — R33.9 INCOMPLETE BLADDER EMPTYING: ICD-10-CM

## 2021-11-23 PROCEDURE — 99443 PR PHYS/QHP TELEPHONE EVALUATION 21-30 MIN: CPT

## 2021-11-23 PROCEDURE — 90838 PSYTX W PT W E/M 60 MIN: CPT

## 2021-11-23 PROCEDURE — 99214 OFFICE O/P EST MOD 30 MIN: CPT

## 2021-11-23 NOTE — PROGRESS NOTES
Chief Complaint  Urinary Tract Infection (Pt was tested at [facility] for uti, cx came back negative, tested for STI's but not finalized)    Justice Pierce presents to [facility]     Agree to visit over telephone? Yes    Located in the state of [place]? Yes     HPI: Onset of increased urinary frequency, decreased bladder emptying, and cloudy urine x 4 days. Went to [facility] and was dx with UTI. Urine culture did not reveal any bacterial growth. Has been taking Macrobid with some improvement in symptoms. Has chlamydia, gonorrhea, and trichomonas pending. Reports only participating in oral sexual intercourse with the same partner for 4 years. Pain is rated as a 0 out of 10.       Subjective         Health Maintenance   Topic   • HPV VACCINES (1 - Male 2-dose series)   • ANNUAL PHYSICAL    • TDAP/TD VACCINES (2 - Td or Tdap)   • HEPATITIS C SCREENING    • COVID-19 Vaccine    • INFLUENZA VACCINE    • Pneumococcal Vaccine 0-64    • MENINGOCOCCAL VACCINE        [facility]  The following portions of the patient's history were reviewed and updated as appropriate: allergies, current medications, past family history, past medical history, past social history, past surgical history and problem list.     Past Medical History:   Diagnosis Date   • Allergic       No Known Allergies   Social History     Tobacco Use   • Smoking status: Never Smoker   • Smokeless tobacco: Never Used   Substance Use Topics   • Alcohol use: Never   • Drug use: Never     No past surgical history on file.   Family History   Problem Relation Age of Onset   • Liver disease Mother    • Cancer Mother    • Migraines Father    • Migraines Sister          Current Outpatient Medications:   •  cetirizine (zyrTEC) 10 MG tablet, Take 1 tablet by mouth Daily., Disp: 30 tablet, Rfl: 5  •  fluticasone (Flonase) 50 MCG/ACT nasal spray, 2 sprays into the nostril(s) as directed by provider Daily., Disp: 16 g, Rfl: 2  •  propranolol (INDERAL) 10 MG tablet,  Take 1-2 tablets by mouth 3 (Three) Times a Day As Needed (for situational anxiety)., Disp: 60 tablet, Rfl: 1    Review of Systems   Respiratory: Negative for apnea, cough, choking, chest tightness, shortness of breath, wheezing and stridor.    Cardiovascular: Negative for chest pain, palpitations and leg swelling.   Genitourinary: Positive for difficulty urinating and frequency. Negative for breast discharge, decreased libido, decreased urine volume, discharge, dysuria, flank pain, genital sores, hematuria, nocturia, penile pain, erectile dysfunction, penile swelling, scrotal swelling, testicular pain, urgency and urinary incontinence.       Objective   Vital Signs  There were no vitals taken for this visit.    Physical Exam     Result Review :     The following data was reviewed by: NONA Powers on 11/23/2021:  UA    Urinalysis 11/21/21 11/24/21   Ketones, UA Negative Negative   Leukocytes, UA Large (3+) (A) Negative   (A) Abnormal value       Comments are available for some flowsheets but are not being displayed.             Assessment and Plan    1. Urinary frequency  - CBC & Differential; Future  - Urinalysis With Culture If Indicated -; Future  - Plan to follow-up in office for further evaluation. Visit scheduled for 11/24.   - If no identifiable etiology for symptoms will refer to urology for further evaluation and management.     2. Incomplete bladder emptying  - CBC & Differential; Future  - Urinalysis With Culture If Indicated -; Future      I spent 27 minutes caring for Justice on this date of service. This time includes time spent by me in the following activities:preparing for the visit, reviewing tests, obtaining and/or reviewing a separately obtained history, performing a medically appropriate examination and/or evaluation , counseling and educating the patient/family/caregiver, ordering medications, tests, or procedures, referring and communicating with other health care professionals  and  documenting information in the medical record    Follow Up     Return in about 1 day (around 11/24/2021).    Patient was given instructions and counseling regarding his condition or for health maintenance advice. Please see specific information pulled into the AVS if appropriate.    Part of this note may be an electronic transcription/translation of spoken language to printed text using the Dragon Dictation System.    Electronically signed by:   NONA Powers  11/23/2021

## 2021-11-23 NOTE — PROGRESS NOTES
Office  Follow Up Visit      Patient Name: Justice Pierce  : 2000   MRN: [id number]     Referring Provider: Julio Cesar Garcia APRN    Chief Complaint: Follow-up and psychotherapy related to:    ICD-10-CM ICD-9-CM   1. Mild episode of recurrent major depressive disorder (HCC)  F33.0 296.31   2. Generalized anxiety disorder  F41.1 300.02   3. Situational anxiety  F41.8 300.09        This provider is located at [facility], using a secure [employer] Video Visit through [facility]. Justice Pierce is being seen remotely via telehealth at their home address in [place], and stated they are in a secure environment for this session. The patient's condition being diagnosed/treated is appropriate for telemedicine. I NONA Wolf identified myself as well as my credentials.   The patient, and/or patients guardian, consent to be seen remotely, and when consent is given they understand that the consent allows for patient identifiable information to be sent to a third party as needed.   They may refuse to be seen remotely at any time. The electronic data is encrypted and password protected, and the patient and/or guardian has been advised of the potential risks to privacy not withstanding such measures.     You have chosen to receive care through a telehealth visit.  Do you consent to use a video/audio connection for your medical care today?  Yes  This visit complies with patient safety concerns in accordance with CDC recommendations.    History of Present Illness:   Justice Pierce is a 20 y.o. male who is here today for follow up related to major depressive disorder, generalized anxiety and performance/test anxiety.  The patient reports that over the past week he had an exam and took propranolol 10 mg before hand.  The patient reports that he felt less shaky and palms were less sweaty during the exam.  The patient is unsure if the medication was helpful or he was  "just less nervous about this exam.  The patient reports that his generalized anxiety is still occurring daily and he has racing thoughts, catastrophizing and assuming the worst.  He reports his mood has been okay over the last week but reports some days with low mood.      Subjective      Review of Systems:   Review of Systems   Psychiatric/Behavioral: Positive for dysphoric mood and stress. Negative for hallucinations, self-injury and suicidal ideas. The patient is nervous/anxious.        PHQ-9 Depression Screening Score:       Patient History:   The following portions of the patient's history were reviewed and updated as appropriate: allergies, current medications, past family history, past medical history, past social history, past surgical history and problem list.     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  The patient has high stress with his classes and is taking engineering and chemistry.  He is feeling overwhelmed with these classes but is maintaining A's and B's.    Medications:     Current Outpatient Medications:   •  cetirizine (zyrTEC) 10 MG tablet, Take 1 tablet by mouth Daily., Disp: 30 tablet, Rfl: 5  •  fluticasone (Flonase) 50 MCG/ACT nasal spray, 2 sprays into the nostril(s) as directed by provider Daily., Disp: 16 g, Rfl: 2  •  propranolol (INDERAL) 10 MG tablet, Take 1-2 tablets by mouth 3 (Three) Times a Day As Needed (for situational anxiety)., Disp: 60 tablet, Rfl: 1    Objective     Physical Exam:  Vital Signs: There were no vitals filed for this visit.  There is no height or weight on file to calculate BMI.     Mental Status Exam:   Appearance: Dressed appropriately to weather and situation  Hygiene: Good  Cooperation: Good  Eye Contact: Normal  Psychomotor Behavior: Normal  Mood: \"Low mood on some days\"  Affect: Full range of affect and currently stated mood  Speech: Normal rate, tone and prosody  Thought Process: Linear, " goal-directed  Thought Content: Endorses excessive anxiety and worry  Suicidal: Denies  Homicidal: Denies  Hallucinations: Denies  Delusion: Denies  Memory: Normal   Orientation: X4  Reliability: Good  Insight: good  Judgement: Good  Impulse Control: Good    Assessment / Plan      Visit Diagnosis/Orders Placed This Visit:  Diagnoses and all orders for this visit:    1. Mild episode of recurrent major depressive disorder (HCC) (Primary)    2. Generalized anxiety disorder    3. Situational anxiety         Plan:   1. Continue propranolol 10 to 20 mg up to 3 times daily as needed for situational and general anxiety  2. Continue psychotherapy    Continue supportive psychotherapy efforts and medications as indicated. Treatment and medication options discussed during today's visit. Patient ackowledged and verbally consented to continue with current treatment plan and was educated on the importance of compliance with treatment and follow-up appointments. Patient seems reasonably able to adhere to treatment plan.      Medication Considerations:  Discussed medication options and treatment plan of prescribed medication(s) as well as the risks, benefits, and potential side effects. Patient is agreeable to call the office with any worsening of symptoms or onset of side effects. Patient is agreeable to call 911 or go to the nearest ER should he/she begin having SI/HI.    Therapy Plan:     Need for therapy based on depression and anxiety    Description of Therapy:   60 minutes spent engaged in supportive psychotherapy with the patient. Mental health diagnoses were addressed in therapy. Utilized supportive approach to engage patient in developing a safe space for patient to process presenting concerns. Clarified presenting problem; employed motivational interviewing techniques to gauge change readiness and devise treatment goals and objectives. Engaged client through insight-oriented approach to create further understanding of  client's patterns. Utilized empathy and positive regard to encourage continued development of therapeutic relationship.     Goals: Patient will continue to gain insight into anxiety.  Patient will utilize strategies discussed in psychotherapy to help mitigate anxiety.    Progress: Patient has expressed very good insight into his anxiety particularly his anxiety to perform.  Patient has expressed insight into his catastrophizing and assuming the worst in several scenarios.    Plan for ongoing work: Continue to work on nonpharmacological management of anxiety and stress.  Patient to begin utilizing thought stopping exercises for anxiety/catastrophizing.    Quality Measures:   I advised Justice of the risks of tobacco use.     Follow Up:   Return in about 2 weeks (around 12/7/2021).      NONA Morris, PMHNP-BC

## 2021-11-24 ENCOUNTER — OFFICE VISIT (OUTPATIENT)
Dept: INTERNAL MEDICINE | Facility: CLINIC | Age: 21
End: 2021-11-24

## 2021-11-24 ENCOUNTER — LAB (OUTPATIENT)
Dept: LAB | Facility: HOSPITAL | Age: 21
End: 2021-11-24

## 2021-11-24 ENCOUNTER — TELEPHONE (OUTPATIENT)
Dept: INTERNAL MEDICINE | Facility: CLINIC | Age: 21
End: 2021-11-24

## 2021-11-24 VITALS
WEIGHT: 212 LBS | DIASTOLIC BLOOD PRESSURE: 80 MMHG | HEART RATE: 95 BPM | TEMPERATURE: 97.7 F | OXYGEN SATURATION: 96 % | HEIGHT: 70 IN | SYSTOLIC BLOOD PRESSURE: 144 MMHG | BODY MASS INDEX: 30.35 KG/M2

## 2021-11-24 DIAGNOSIS — R35.0 URINARY FREQUENCY: ICD-10-CM

## 2021-11-24 DIAGNOSIS — R33.9 INCOMPLETE BLADDER EMPTYING: ICD-10-CM

## 2021-11-24 DIAGNOSIS — R30.0 DYSURIA: Primary | ICD-10-CM

## 2021-11-24 DIAGNOSIS — N50.811 PAIN IN RIGHT TESTICLE: ICD-10-CM

## 2021-11-24 DIAGNOSIS — R30.0 DYSURIA: ICD-10-CM

## 2021-11-24 LAB
BASOPHILS # BLD AUTO: 0.08 10*3/MM3 (ref 0–0.2)
BASOPHILS NFR BLD AUTO: 1.2 % (ref 0–1.5)
BILIRUB BLD-MCNC: NEGATIVE MG/DL
BILIRUB UR QL STRIP: NEGATIVE
CLARITY UR: CLEAR
CLARITY, POC: CLEAR
COLOR UR: ABNORMAL
COLOR UR: NORMAL
DEPRECATED RDW RBC AUTO: 41.8 FL (ref 37–54)
EOSINOPHIL # BLD AUTO: 0.17 10*3/MM3 (ref 0–0.4)
EOSINOPHIL NFR BLD AUTO: 2.6 % (ref 0.3–6.2)
ERYTHROCYTE [DISTWIDTH] IN BLOOD BY AUTOMATED COUNT: 13.3 % (ref 12.3–15.4)
EXPIRATION DATE: NORMAL
GLUCOSE UR STRIP-MCNC: NEGATIVE MG/DL
GLUCOSE UR STRIP-MCNC: NEGATIVE MG/DL
HCT VFR BLD AUTO: 50.3 % (ref 37.5–51)
HGB BLD-MCNC: 16.6 G/DL (ref 13–17.7)
HGB UR QL STRIP.AUTO: NEGATIVE
IMM GRANULOCYTES # BLD AUTO: 0.01 10*3/MM3 (ref 0–0.05)
IMM GRANULOCYTES NFR BLD AUTO: 0.2 % (ref 0–0.5)
KETONES UR QL STRIP: ABNORMAL
KETONES UR QL: NEGATIVE
LEUKOCYTE EST, POC: NEGATIVE
LEUKOCYTE ESTERASE UR QL STRIP.AUTO: NEGATIVE
LYMPHOCYTES # BLD AUTO: 2.73 10*3/MM3 (ref 0.7–3.1)
LYMPHOCYTES NFR BLD AUTO: 41.7 % (ref 19.6–45.3)
Lab: NORMAL
MCH RBC QN AUTO: 28.7 PG (ref 26.6–33)
MCHC RBC AUTO-ENTMCNC: 33 G/DL (ref 31.5–35.7)
MCV RBC AUTO: 86.9 FL (ref 79–97)
MONOCYTES # BLD AUTO: 0.58 10*3/MM3 (ref 0.1–0.9)
MONOCYTES NFR BLD AUTO: 8.9 % (ref 5–12)
NEUTROPHILS NFR BLD AUTO: 2.98 10*3/MM3 (ref 1.7–7)
NEUTROPHILS NFR BLD AUTO: 45.4 % (ref 42.7–76)
NITRITE UR QL STRIP: NEGATIVE
NITRITE UR-MCNC: NEGATIVE MG/ML
NRBC BLD AUTO-RTO: 0 /100 WBC (ref 0–0.2)
PH UR STRIP.AUTO: 6.5 [PH] (ref 5–8)
PH UR: 6 [PH] (ref 5–8)
PLATELET # BLD AUTO: 410 10*3/MM3 (ref 140–450)
PMV BLD AUTO: 9.1 FL (ref 6–12)
PROT UR QL STRIP: NEGATIVE
PROT UR STRIP-MCNC: NEGATIVE MG/DL
RBC # BLD AUTO: 5.79 10*6/MM3 (ref 4.14–5.8)
RBC # UR STRIP: NEGATIVE /UL
SP GR UR STRIP: 1.02 (ref 1–1.03)
SP GR UR: 1.03 (ref 1–1.03)
UROBILINOGEN UR QL STRIP: ABNORMAL
UROBILINOGEN UR QL: NORMAL
WBC NRBC COR # BLD: 6.55 10*3/MM3 (ref 3.4–10.8)

## 2021-11-24 PROCEDURE — 99215 OFFICE O/P EST HI 40 MIN: CPT

## 2021-11-24 PROCEDURE — G0432 EIA HIV-1/HIV-2 SCREEN: HCPCS

## 2021-11-24 PROCEDURE — 84153 ASSAY OF PSA TOTAL: CPT

## 2021-11-24 PROCEDURE — 86592 SYPHILIS TEST NON-TREP QUAL: CPT

## 2021-11-24 PROCEDURE — 81003 URINALYSIS AUTO W/O SCOPE: CPT

## 2021-11-24 PROCEDURE — 36415 COLL VENOUS BLD VENIPUNCTURE: CPT

## 2021-11-24 PROCEDURE — 87798 DETECT AGENT NOS DNA AMP: CPT

## 2021-11-24 PROCEDURE — 85025 COMPLETE CBC W/AUTO DIFF WBC: CPT

## 2021-11-24 PROCEDURE — 87563 M. GENITALIUM AMP PROBE: CPT

## 2021-11-24 NOTE — PROGRESS NOTES
Chief Complaint  Difficulty Urinating (Follow up from telehealth visit 11/23/21, pt c/o mild pressure in testicles off and on) and Diarrhea    Justice Pierce presents to [facility]    HPI: Symptom onset was 11/17. Was dx with a UTI on 11/21 and treated with macrobid. Culture showed no growth. Symptoms have improved somewhat. Reports having right sided testicular pain that feels like pressure. Had STI testing performed which was negative. Still experiencing increased urgency and frequency. Pain is rated as a 4 out of 10.     Subjective       PMSFH  The following portions of the patient's history were reviewed and updated as appropriate: allergies, current medications, past family history, past medical history, past social history, past surgical history and problem list.     Past Medical History:   Diagnosis Date   • Allergic       No Known Allergies   Social History     Tobacco Use   • Smoking status: Never Smoker   • Smokeless tobacco: Never Used   Substance Use Topics   • Alcohol use: Never   • Drug use: Never     History reviewed. No pertinent surgical history.   Family History   Problem Relation Age of Onset   • Liver disease Mother    • Cancer Mother    • Migraines Father    • Migraines Sister          Current Outpatient Medications:   •  cetirizine (zyrTEC) 10 MG tablet, Take 1 tablet by mouth Daily., Disp: 30 tablet, Rfl: 5  •  fluticasone (Flonase) 50 MCG/ACT nasal spray, 2 sprays into the nostril(s) as directed by provider Daily., Disp: 16 g, Rfl: 2  •  propranolol (INDERAL) 10 MG tablet, Take 1-2 tablets by mouth 3 (Three) Times a Day As Needed (for situational anxiety)., Disp: 60 tablet, Rfl: 1    Review of Systems   Constitutional: Negative for activity change, appetite change, chills, fatigue and fever.   HENT: Negative for congestion, dental problem, drooling, ear discharge, ear pain, facial swelling, hearing loss, mouth sores, nosebleeds, postnasal drip, rhinorrhea,  "sinus pressure, sneezing, sore throat, swollen glands, tinnitus, trouble swallowing and voice change.    Respiratory: Negative for apnea, cough, choking, chest tightness, shortness of breath, wheezing and stridor.    Cardiovascular: Negative for chest pain, palpitations and leg swelling.   Gastrointestinal: Negative for abdominal distention, abdominal pain, anal bleeding, blood in stool, constipation, diarrhea, nausea, rectal pain, vomiting, GERD and indigestion.   Genitourinary: Positive for frequency, testicular pain and urgency. Negative for breast discharge, decreased libido, decreased urine volume, difficulty urinating, discharge, dysuria, flank pain, genital sores, hematuria, nocturia, penile pain, erectile dysfunction, penile swelling, scrotal swelling and urinary incontinence.   Allergic/Immunologic: Negative for environmental allergies and food allergies.       Objective   Vital Signs  /80   Pulse 95   Temp 97.7 °F (36.5 °C)   Ht 177.8 cm (70\")   Wt 96.2 kg (212 lb)   SpO2 96%   BMI 30.42 kg/m²     Physical Exam  Exam conducted with a chaperone present.   Constitutional:       Appearance: Normal appearance.   HENT:      Head: Normocephalic.      Mouth/Throat:      Mouth: Mucous membranes are moist.      Pharynx: Oropharynx is clear.   Eyes:      Extraocular Movements: Extraocular movements intact.      Conjunctiva/sclera: Conjunctivae normal.      Pupils: Pupils are equal, round, and reactive to light.   Cardiovascular:      Rate and Rhythm: Normal rate and regular rhythm.      Pulses: Normal pulses.      Heart sounds: Normal heart sounds.   Pulmonary:      Effort: Pulmonary effort is normal.      Breath sounds: Normal breath sounds.   Abdominal:      General: Bowel sounds are normal.      Palpations: Abdomen is soft.      Hernia: There is no hernia in the left inguinal area or right inguinal area.   Genitourinary:     Pubic Area: No rash.       Penis: Normal.       Testes:         Right: Mass, " tenderness, swelling, testicular hydrocele or varicocele not present. Right testis is descended. Cremasteric reflex is present.          Left: Mass, tenderness, swelling, testicular hydrocele or varicocele not present. Left testis is descended. Cremasteric reflex is present.       Epididymis:      Right: Not inflamed or enlarged. No mass or tenderness.      Left: Not inflamed or enlarged. No mass or tenderness.   Lymphadenopathy:      Lower Body: No right inguinal adenopathy. No left inguinal adenopathy.   Skin:     General: Skin is warm and dry.      Capillary Refill: Capillary refill takes less than 2 seconds.   Neurological:      Mental Status: He is alert and oriented to person, place, and time.   Psychiatric:         Mood and Affect: Mood normal.         Behavior: Behavior normal.         Thought Content: Thought content normal.         Judgment: Judgment normal.          Result Review :     The following data was reviewed by: NONA Powers on 11/24/2021:  UA    Urinalysis 11/21/21 11/24/21   Ketones, UA Negative Negative   Leukocytes, UA Large (3+) (A) Negative   (A) Abnormal value       Comments are available for some flowsheets but are not being displayed.           Urine Culture    Urine Culture 11/21/21   Urine Culture No growth             Assessment and Plan    1. Dysuria  - POCT urinalysis dipstick, automated  - PSA DIAGNOSTIC ONLY; Future  - Genital Mycoplasma BC Urine - Urine, Clean Catch; Future  - RPR; Future  - HIV-1/O/2 Ag/Ab w Reflex; Future  - Ambulatory Referral to Urology  - POC dipstick negative in office.   - PE was unremarkable. Will obtain labs and US to evaluate for etiology.   - If no identifiable etiology would consider use of Flomax to treat LUTS but would prefer urology to evaluate patient due to young age.     2. Pain in right testicle  - US Scrotum & Testicles; Future      I spent 45 minutes caring for Justice on this date of service. This time includes time spent by me in the  following activities:preparing for the visit, reviewing tests, obtaining and/or reviewing a separately obtained history, performing a medically appropriate examination and/or evaluation , counseling and educating the patient/family/caregiver, ordering medications, tests, or procedures, referring and communicating with other health care professionals , documenting information in the medical record, independently interpreting results and communicating that information with the patient/family/caregiver and care coordination    Follow Up     Return for Next scheduled follow up.    Patient was given instructions and counseling regarding his condition or for health maintenance advice. Please see specific information pulled into the AVS if appropriate.    Part of this note may be an electronic transcription/translation of spoken language to printed text using the Dragon Dictation System.    Electronically signed by:   Julio Cesar Garcia, NONA  11/24/2021

## 2021-11-25 LAB
HIV1+2 AB SER QL: NORMAL
PSA SERPL-MCNC: 1.43 NG/ML (ref 0–4)
RPR SER QL: NORMAL

## 2021-11-29 ENCOUNTER — APPOINTMENT (OUTPATIENT)
Dept: ULTRASOUND IMAGING | Facility: HOSPITAL | Age: 21
End: 2021-11-29

## 2021-12-08 ENCOUNTER — TELEMEDICINE (OUTPATIENT)
Dept: BEHAVIORAL HEALTH | Facility: CLINIC | Age: 21
End: 2021-12-08

## 2021-12-08 DIAGNOSIS — F33.0 MILD EPISODE OF RECURRENT MAJOR DEPRESSIVE DISORDER (HCC): Primary | ICD-10-CM

## 2021-12-08 DIAGNOSIS — F41.1 GENERALIZED ANXIETY DISORDER: ICD-10-CM

## 2021-12-08 DIAGNOSIS — F41.8 SITUATIONAL ANXIETY: ICD-10-CM

## 2021-12-08 PROCEDURE — 99214 OFFICE O/P EST MOD 30 MIN: CPT

## 2021-12-08 PROCEDURE — 90838 PSYTX W PT W E/M 60 MIN: CPT

## 2021-12-08 NOTE — PROGRESS NOTES
Office  Follow Up Visit      Patient Name: Justice Pierce  : 2000   MRN: [id number]     Referring Provider: Julio Cesar Garcia APRN    Chief Complaint: Follow-up and psychotherapy related to:    ICD-10-CM ICD-9-CM   1. Mild episode of recurrent major depressive disorder (HCC)  F33.0 296.31   2. Generalized anxiety disorder  F41.1 300.02   3. Situational anxiety  F41.8 300.09        History of Present Illness:   Justice Pierce is a 21 y.o. male who is here today for follow up related to depression, anxiety and situational anxiety.  The patient reports that over the past week he has had a really hard time.  He reports he has had a lot of realizations about himself that he would like to change/work on.  His anxiety is still present and is significantly worse in social situations and in performance settings.  Reports he still having significant anxiety while taking tests which is partially helped by the propranolol.      Subjective      Review of Systems:   Review of Systems   Psychiatric/Behavioral: Positive for depressed mood and stress. Negative for self-injury and suicidal ideas. The patient is nervous/anxious.        PHQ-9 Depression Screening Score:       Patient History:   The following portions of the patient's history were reviewed and updated as appropriate: allergies, current medications, past family history, past medical history, past social history, past surgical history and problem list.     Social:   The patient is entering his final exam week before going on winter break.  The patient will be in [place] with his parents for a break.    Medications:     Current Outpatient Medications:   •  cetirizine (zyrTEC) 10 MG tablet, Take 1 tablet by mouth Daily., Disp: 30 tablet, Rfl: 5  •  fluticasone (Flonase) 50 MCG/ACT nasal spray, 2 sprays into the nostril(s) as directed by provider Daily., Disp: 16 g, Rfl: 2  •  propranolol (INDERAL) 10 MG tablet, Take 1-2 tablets by mouth 3 (Three) Times a  Day As Needed (for situational anxiety)., Disp: 60 tablet, Rfl: 1    Objective     Physical Exam:  Vital Signs: There were no vitals filed for this visit.  There is no height or weight on file to calculate BMI.     Mental Status Exam:   Appearance: Dressed appropriately to situation and weather  Hygiene: Good  Cooperation: Good  Eye Contact: Normal  Psychomotor Behavior: Normal  Mood: Down  Affect: Tearful at times, congruent with stated mood  Speech: Normal rate, tone and prosody  Thought Process: Linear, goal-directed  Thought Content: Normal  Suicidal: Denies  Homicidal: Denies  Hallucinations: Denies  Delusion: Denies  Memory: Normal  Orientation: X4  Reliability: Good  Insight: Good  Judgement: Good  Impulse Control: Good    Assessment / Plan      Visit Diagnosis/Orders Placed This Visit:  Diagnoses and all orders for this visit:    1. Mild episode of recurrent major depressive disorder (HCC) (Primary)    2. Generalized anxiety disorder    3. Situational anxiety         Plan:   1. Continue propranolol 10 to 20 mg up to 3 times daily as needed  2. Continue psychotherapy    Continue supportive psychotherapy efforts and medications as indicated. Treatment and medication options discussed during today's visit. Patient ackowledged and verbally consented to continue with current treatment plan and was educated on the importance of compliance with treatment and follow-up appointments. Patient seems reasonably able to adhere to treatment plan.      Medication Considerations:  Discussed medication options and treatment plan of prescribed medication(s) as well as the risks, benefits, and potential side effects. Patient is agreeable to call the office with any worsening of symptoms or onset of side effects. Patient is agreeable to call 911 or go to the nearest ER should he/she begin having SI/HI.    Therapy Plan:     Need for therapy based on depression and anxiety.    Description of Therapy:   55 minutes spent engaged in  supportive psychotherapy with the patient. Mental health diagnoses were addressed in therapy. Utilized supportive approach to engage patient in developing a safe space for patient to process presenting concerns. Clarified presenting problem; employed motivational interviewing techniques to gauge change readiness and devise treatment goals and objectives. Engaged client through insight-oriented approach to create further understanding of client's patterns. Utilized empathy and positive regard to encourage continued development of therapeutic relationship.   Time started: 4:35 PM  Time completed: 5:30 PM    Goals: Patient will continue to explore thought patterns and emotions related to comparing himself to others.  The patient will continue to explore ways he can be more himself in his interpersonal relationships.    Progress: The patient has made good progress in identifying areas of concern including his difficulty in being honest particularly related to academic performance.    Plan for ongoing work: Continue to support and encourage patient to work on being himself in his current and new interpersonal relationships.  Continue to explore patterns of comparisons with his peers.    Quality Measures:   I advised Justice of the risks of tobacco use.     Follow Up:   No follow-ups on file.      NONA Morris, PMHNP-BC

## 2021-12-17 ENCOUNTER — LAB (OUTPATIENT)
Dept: LAB | Facility: HOSPITAL | Age: 21
End: 2021-12-17

## 2021-12-17 ENCOUNTER — OFFICE VISIT (OUTPATIENT)
Dept: INTERNAL MEDICINE | Facility: CLINIC | Age: 21
End: 2021-12-17

## 2021-12-17 VITALS
HEIGHT: 70 IN | BODY MASS INDEX: 30.21 KG/M2 | RESPIRATION RATE: 16 BRPM | SYSTOLIC BLOOD PRESSURE: 110 MMHG | HEART RATE: 89 BPM | OXYGEN SATURATION: 100 % | TEMPERATURE: 98.1 F | WEIGHT: 211 LBS | DIASTOLIC BLOOD PRESSURE: 70 MMHG

## 2021-12-17 DIAGNOSIS — R39.15 URGENCY OF URINATION: Primary | ICD-10-CM

## 2021-12-17 DIAGNOSIS — R39.9 LOWER URINARY TRACT SYMPTOMS (LUTS): ICD-10-CM

## 2021-12-17 LAB
BILIRUB BLD-MCNC: NEGATIVE MG/DL
CLARITY, POC: CLEAR
COLOR UR: YELLOW
EXPIRATION DATE: NORMAL
GLUCOSE UR STRIP-MCNC: NEGATIVE MG/DL
KETONES UR QL: NEGATIVE
LEUKOCYTE EST, POC: NEGATIVE
Lab: NORMAL
NITRITE UR-MCNC: NEGATIVE MG/ML
PH UR: 6 [PH] (ref 5–8)
PROT UR STRIP-MCNC: NEGATIVE MG/DL
RBC # UR STRIP: NEGATIVE /UL
SP GR UR: 1.02 (ref 1–1.03)
UROBILINOGEN UR QL: NORMAL

## 2021-12-17 PROCEDURE — 87563 M. GENITALIUM AMP PROBE: CPT

## 2021-12-17 PROCEDURE — 81003 URINALYSIS AUTO W/O SCOPE: CPT

## 2021-12-17 PROCEDURE — 87591 N.GONORRHOEAE DNA AMP PROB: CPT

## 2021-12-17 PROCEDURE — 87661 TRICHOMONAS VAGINALIS AMPLIF: CPT

## 2021-12-17 PROCEDURE — 87491 CHLMYD TRACH DNA AMP PROBE: CPT

## 2021-12-17 PROCEDURE — 99214 OFFICE O/P EST MOD 30 MIN: CPT

## 2021-12-17 PROCEDURE — 87798 DETECT AGENT NOS DNA AMP: CPT

## 2021-12-17 PROCEDURE — 87086 URINE CULTURE/COLONY COUNT: CPT

## 2021-12-17 NOTE — PROGRESS NOTES
Chief Complaint  Bladder Problem (Pt states urgency and uncomfortable most all of time)    Justice Pierce presents to [facility]    HPI: Having increased urination and tingling in the urethra x 7 days. Had similar symptoms a few weeks prior with all work-up negative. Was referred to urology but was unable to make the appointment. Denies any urine abnormality. No new sexual partners. Feels like he is not completely emptying his bladder and feels as if he needs to pee constantly. Still experiencing pain in the uretha when urinating. Pain is rated as a 6 out of 10.     Subjective       Health Maintenance   Topic   • HPV VACCINES (1 - Male 2-dose series)   • COVID-19 Vaccine (3 - Booster for Pfizer series)   • ANNUAL PHYSICAL    • TDAP/TD VACCINES (2 - Td or Tdap)   • HEPATITIS C SCREENING    • INFLUENZA VACCINE    • Pneumococcal Vaccine 0-64    • MENINGOCOCCAL VACCINE        [facility] [place] [facility]  The following portions of the patient's history were reviewed and updated as appropriate: allergies, current medications, past family history, past medical history, past social history, past surgical history and problem list.     Past Medical History:   Diagnosis Date   • Allergic       No Known Allergies   Social History     Tobacco Use   • Smoking status: Never Smoker   • Smokeless tobacco: Never Used   Substance Use Topics   • Alcohol use: Never   • Drug use: Never     History reviewed. No pertinent surgical history.   Family History   Problem Relation Age of Onset   • Liver disease Mother    • Cancer Mother    • Migraines Father    • Migraines Sister          Current Outpatient Medications:   •  cetirizine (zyrTEC) 10 MG tablet, Take 1 tablet by mouth Daily., Disp: 30 tablet, Rfl: 5  •  fluticasone (Flonase) 50 MCG/ACT nasal spray, 2 sprays into the nostril(s) as directed by provider Daily., Disp: 16 g, Rfl: 2  •  propranolol (INDERAL) 10 MG tablet, Take 1-2 tablets by mouth 3 (Three) Times a Day As  "Needed (for situational anxiety)., Disp: 60 tablet, Rfl: 1    Review of Systems   Constitutional: Negative for activity change, appetite change, chills, fatigue and fever.   Respiratory: Negative for apnea, cough, choking, chest tightness, shortness of breath, wheezing and stridor.    Cardiovascular: Negative for chest pain, palpitations and leg swelling.   Gastrointestinal: Negative for abdominal distention, abdominal pain, anal bleeding, blood in stool, constipation, diarrhea, nausea, rectal pain, vomiting, GERD and indigestion.   Genitourinary: Positive for dysuria, frequency and urgency. Negative for breast discharge, decreased libido, decreased urine volume, difficulty urinating, discharge, flank pain, genital sores, hematuria, nocturia, penile pain, erectile dysfunction, penile swelling, scrotal swelling, testicular pain and urinary incontinence.   Skin: Negative for color change, dry skin, pallor, rash, skin lesions and bruise.       Objective   Vital Signs  /70   Pulse 89   Temp 98.1 °F (36.7 °C)   Resp 16   Ht 177.8 cm (70\")   Wt 95.7 kg (211 lb)   SpO2 100%   BMI 30.28 kg/m²     Physical Exam  Constitutional:       General: He is not in acute distress.     Appearance: Normal appearance. He is normal weight. He is not ill-appearing or toxic-appearing.   HENT:      Head: Normocephalic.      Right Ear: Hearing, tympanic membrane, ear canal and external ear normal.      Left Ear: Hearing, tympanic membrane, ear canal and external ear normal.      Nose: Nose normal. No congestion or rhinorrhea.      Mouth/Throat:      Mouth: Mucous membranes are moist.      Pharynx: Oropharynx is clear. No oropharyngeal exudate or posterior oropharyngeal erythema.   Eyes:      General: Lids are normal. No allergic shiner, visual field deficit or scleral icterus.     Extraocular Movements: Extraocular movements intact.      Right eye: No nystagmus.      Left eye: No nystagmus.      Conjunctiva/sclera: Conjunctivae " normal.      Pupils: Pupils are equal, round, and reactive to light.   Neck:      Thyroid: No thyroid mass, thyromegaly or thyroid tenderness.      Vascular: No carotid bruit.      Trachea: Trachea and phonation normal.   Cardiovascular:      Rate and Rhythm: Normal rate and regular rhythm.      Chest Wall: PMI is not displaced. No thrill.      Pulses: Normal pulses.           Radial pulses are 2+ on the right side and 2+ on the left side.        Dorsalis pedis pulses are 2+ on the right side and 2+ on the left side.        Posterior tibial pulses are 2+ on the right side and 2+ on the left side.      Heart sounds: No murmur heard.  No gallop. No S3 sounds.    Pulmonary:      Effort: Pulmonary effort is normal.      Breath sounds: Normal breath sounds.   Chest:   Breasts:      Right: No axillary adenopathy or supraclavicular adenopathy.      Left: No axillary adenopathy or supraclavicular adenopathy.       Abdominal:      General: Abdomen is flat. Bowel sounds are normal.      Palpations: Abdomen is soft.      Tenderness: There is no abdominal tenderness. There is no guarding or rebound.      Hernia: No hernia is present.   Musculoskeletal:         General: Normal range of motion.      Cervical back: Normal range of motion and neck supple. No rigidity.      Right lower leg: No edema.      Left lower leg: No edema.   Lymphadenopathy:      Head:      Right side of head: No submental, submandibular, tonsillar, preauricular, posterior auricular or occipital adenopathy.      Left side of head: No submental, submandibular, tonsillar, preauricular, posterior auricular or occipital adenopathy.      Cervical: No cervical adenopathy.      Upper Body:      Right upper body: No supraclavicular, axillary, pectoral or epitrochlear adenopathy.      Left upper body: No supraclavicular, axillary, pectoral or epitrochlear adenopathy.   Skin:     General: Skin is warm and dry.      Capillary Refill: Capillary refill takes less than 2  seconds.      Findings: No abrasion.      Nails: There is no clubbing.   Neurological:      General: No focal deficit present.      Mental Status: He is alert and oriented to person, place, and time.      GCS: GCS eye subscore is 4. GCS verbal subscore is 5. GCS motor subscore is 6.      Cranial Nerves: Cranial nerves are intact.      Motor: Motor function is intact.      Coordination: Coordination is intact.      Deep Tendon Reflexes: Reflexes are normal and symmetric.   Psychiatric:         Attention and Perception: Attention and perception normal.         Mood and Affect: Mood and affect normal.         Speech: Speech normal.         Behavior: Behavior normal. Behavior is cooperative.         Thought Content: Thought content normal.         Cognition and Memory: Cognition and memory normal.         Judgment: Judgment normal.          Result Review :     The following data was reviewed by: NONA Powers on 12/17/2021:  UA    Urinalysis 11/21/21 11/24/21 11/24/21 12/17/21     0937 1202    Specific Gravity, UA   1.024    Ketones, UA Negative Negative Trace (A) Negative   Blood, UA   Negative    Leukocytes, UA Large (3+) (A) Negative Negative Negative   Nitrite, UA   Negative    (A) Abnormal value       Comments are available for some flowsheets but are not being displayed.             Assessment and Plan    1. Urgency of urination  - POCT urinalysis dipstick, automated  - UA negative.     2. Lower urinary tract symptoms (LUTS)  - Ambulatory Referral to Urology  - Genital Mycoplasma BC Urine - Urine, Clean Catch; Future  - Chlamydia trachomatis, Neisseria gonorrhoeae, Trichomonas vaginalis, PCR - Urine, Urine, Clean Catch; Future  - Urine Culture - Urine, Urine, Clean Catch  - Will review labs to evaluate for cause for symptoms. If work-up is negative would consider trial of flomax and evaluate for improvement in symptoms. However, will hold off until patient is able to follow-up with urology.   - Referred to  urology for further evaluation and management.     Follow Up     Return for Next scheduled follow up.    Patient was given instructions and counseling regarding his condition or for health maintenance advice. Please see specific information pulled into the AVS if appropriate.    Part of this note may be an electronic transcription/translation of spoken language to printed text using the Dragon Dictation System.    Electronically signed by:   NONA Powers  12/17/2021

## 2021-12-20 LAB
BACTERIA UR CULT: NO GROWTH
BACTERIA UR CULT: NORMAL
C TRACH RRNA SPEC QL NAA+PROBE: NEGATIVE
N GONORRHOEA RRNA SPEC QL NAA+PROBE: NEGATIVE
SPECIMEN STATUS: NORMAL
T VAGINALIS DNA SPEC QL NAA+PROBE: NEGATIVE

## 2021-12-28 ENCOUNTER — TELEMEDICINE (OUTPATIENT)
Dept: BEHAVIORAL HEALTH | Facility: CLINIC | Age: 21
End: 2021-12-28

## 2021-12-28 DIAGNOSIS — F41.8 SITUATIONAL ANXIETY: ICD-10-CM

## 2021-12-28 DIAGNOSIS — F41.1 GENERALIZED ANXIETY DISORDER: ICD-10-CM

## 2021-12-28 DIAGNOSIS — F33.0 MILD EPISODE OF RECURRENT MAJOR DEPRESSIVE DISORDER (HCC): Primary | ICD-10-CM

## 2021-12-28 PROCEDURE — 90838 PSYTX W PT W E/M 60 MIN: CPT

## 2021-12-28 PROCEDURE — 99214 OFFICE O/P EST MOD 30 MIN: CPT

## 2021-12-28 NOTE — PROGRESS NOTES
Office  Follow Up Visit      Patient Name: Justice Pierce  : 2000   MRN: [id number]     Referring Provider: Julio Cesar Garcia APRN    Chief Complaint: Follow-up and psychotherapy related to:    ICD-10-CM ICD-9-CM   1. Mild episode of recurrent major depressive disorder (HCC)  F33.0 296.31   2. Generalized anxiety disorder  F41.1 300.02   3. Situational anxiety  F41.8 300.09        This provider is located at [facility], using a secure [employer] Video Visit through [employer]. Justice Pierce is being seen remotely via telehealth at their home address in [place], and stated they are in a secure environment for this session. The patient's condition being diagnosed/treated is appropriate for telemedicine. I NONA Mcneill identified myself as well as my credentials.   The patient, and/or patients guardian, consent to be seen remotely, and when consent is given they understand that the consent allows for patient identifiable information to be sent to a third party as needed.   They may refuse to be seen remotely at any time. The electronic data is encrypted and password protected, and the patient and/or guardian has been advised of the potential risks to privacy not withstanding such measures.     You have chosen to receive care through a telehealth visit.  Do you consent to use a video/audio connection for your medical care today?  Yes  This visit complies with patient safety concerns in accordance with CDC recommendations.    History of Present Illness:   Justice Pierce is a 21 y.o. male who is being seen today for follow up related to depression, anxiety and situational anxiety.  The patient reports that his anxiety has been significantly better since being home from school on winter vacation.  He reports the situational anxiety around test taking has been much improved with propranolol.  Patient reports he has not had any serious breakdowns and has had decreased  "anxiety, less sweaty palms and decreased feelings like his heart is racing during tests after taking the propranolol.      Subjective      Review of Systems:   Review of Systems   Cardiovascular: Negative for chest pain, palpitations and leg swelling.   Psychiatric/Behavioral: Positive for dysphoric mood and stress. Negative for self-injury and suicidal ideas. The patient is nervous/anxious.        PHQ-9 Depression Screening Score:       Patient History:   The following portions of the patient's history were reviewed and updated as appropriate: allergies, current medications, past family history, past medical history, past social history, past surgical history and problem list.     Social:   On winter break from the [employer].  Has been trying to get back and playing video games with his friends.    Medications:     Current Outpatient Medications:   •  cetirizine (zyrTEC) 10 MG tablet, Take 1 tablet by mouth Daily., Disp: 30 tablet, Rfl: 5  •  fluticasone (Flonase) 50 MCG/ACT nasal spray, 2 sprays into the nostril(s) as directed by provider Daily., Disp: 16 g, Rfl: 2  •  propranolol (INDERAL) 10 MG tablet, Take 1-2 tablets by mouth 3 (Three) Times a Day As Needed (for situational anxiety)., Disp: 60 tablet, Rfl: 1    Objective     Physical Exam:  Vital Signs: There were no vitals filed for this visit.  There is no height or weight on file to calculate BMI.     Mental Status Exam:   Appearance: Dressed appropriately to situation and weather  Hygiene: Good  Cooperation: Good  Eye Contact: Normal  Psychomotor Behavior: Normal  Mood: \"Better\"  Affect: Pleasant, full range of affect  Speech: Normal rate, tone and prosody  Thought Process: Linear, goal-directed  Thought Content: Normal  Suicidal: Denies  Homicidal: Denies  Hallucinations: Denies  Delusion: Denies  Memory: Normal  Orientation: X4  Reliability: Good  Insight: Good  Judgement: Good  Impulse Control: Good    Assessment / Plan      Visit " Diagnosis/Orders Placed This Visit:  Diagnoses and all orders for this visit:    1. Mild episode of recurrent major depressive disorder (HCC) (Primary)    2. Generalized anxiety disorder    3. Situational anxiety         Plan:   1. Continue propranolol 10 mg up to 3 times daily as needed for situational anxiety  2. Continue psychotherapy    Continue supportive psychotherapy efforts and medications as indicated. Treatment and medication options discussed during today's visit. Patient ackowledged and verbally consented to continue with current treatment plan and was educated on the importance of compliance with treatment and follow-up appointments. Patient seems reasonably able to adhere to treatment plan.      Medication Considerations:  Discussed medication options and treatment plan of prescribed medication(s) as well as the risks, benefits, and potential side effects. Patient is agreeable to call the office with any worsening of symptoms or onset of side effects. Patient is agreeable to call 911 or go to the nearest ER should he/she begin having SI/HI.    Therapy Plan:     Need for therapy based on depression, anxiety, life stress    Description of Therapy:   60 minutes spent engaged in supportive psychotherapy with the patient. Mental health diagnoses were addressed in therapy. Utilized supportive approach to engage patient in developing a safe space for patient to process presenting concerns. Clarified presenting problem; employed motivational interviewing techniques to gauge change readiness and devise treatment goals and objectives. Engaged client through insight-oriented approach to create further understanding of client's patterns. Utilized empathy and positive regard to encourage continued development of therapeutic relationship.   Session started: 2 PM  Session ended: 3 PM    Topics discussed:  We continued to discuss his social anxiety and his low self-esteem.  The patient continues to compare himself to  others leaving him feeling inadequate.  We also discussed his propensity to live his life in the future and not be present.  We discussed significant anxiety and stress this places on him.  We discussed strategies and how to live more in the moment and enjoy his life now.    Quality Measures:   I advised Justice of the risks of tobacco use.     Follow Up:   Return in 9 days (on 1/6/2022) for Therapy Follow Up.      NONA Morris, PMHNP-BC

## 2021-12-31 LAB
M GENITALIUM DNA UR QL NAA+PROBE: NEGATIVE
M HOMINIS DNA SPEC QL NAA+PROBE: NEGATIVE
UREAPLASMA DNA SPEC QL NAA+PROBE: NEGATIVE

## 2022-01-12 ENCOUNTER — TELEMEDICINE (OUTPATIENT)
Dept: BEHAVIORAL HEALTH | Facility: CLINIC | Age: 22
End: 2022-01-12

## 2022-01-12 DIAGNOSIS — F40.10 SOCIAL ANXIETY DISORDER: ICD-10-CM

## 2022-01-12 DIAGNOSIS — F33.0 MILD EPISODE OF RECURRENT MAJOR DEPRESSIVE DISORDER: Primary | ICD-10-CM

## 2022-01-12 DIAGNOSIS — F41.1 GENERALIZED ANXIETY DISORDER: ICD-10-CM

## 2022-01-12 PROCEDURE — 90838 PSYTX W PT W E/M 60 MIN: CPT

## 2022-01-12 PROCEDURE — 99214 OFFICE O/P EST MOD 30 MIN: CPT

## 2022-01-12 RX ORDER — ESCITALOPRAM OXALATE 10 MG/1
10 TABLET ORAL DAILY
Qty: 30 TABLET | Refills: 2 | Status: SHIPPED | OUTPATIENT
Start: 2022-01-12 | End: 2022-05-02

## 2022-01-13 NOTE — PROGRESS NOTES
Office  Follow Up Visit      Patient Name: Justice Pierce  : 2000   MRN: [id number]     Referring Provider: Julio Cesar Garcia APRN    Chief Complaint: Follow-up and psychotherapy related to:    ICD-10-CM ICD-9-CM   1. Mild episode of recurrent major depressive disorder (HCC)  F33.0 296.31   2. Generalized anxiety disorder  F41.1 300.02   3. Social anxiety disorder  F40.10 300.23      This provider is located at [facility], using a secure [employer] Video Visit through [employer]. Justice Pierce is being seen remotely via telehealth at their home address in [place], and stated they are in a secure environment for this session. The patient's condition being diagnosed/treated is appropriate for telemedicine. I NONA Mcneill identified myself as well as my credentials.   The patient, and/or patients guardian, consent to be seen remotely, and when consent is given they understand that the consent allows for patient identifiable information to be sent to a third party as needed.   They may refuse to be seen remotely at any time. The electronic data is encrypted and password protected, and the patient and/or guardian has been advised of the potential risks to privacy not withstanding such measures.     You have chosen to receive care through a telehealth visit.  Do you consent to use a video/audio connection for your medical care today?  Yes  This visit complies with patient safety concerns in accordance with CDC recommendations.    History of Present Illness: Justice Pierce is a 21 y.o. male who I spoke with on video visit today for follow-up and psychotherapy for depression, anxiety and social anxiety.  The patient reports that over the last several weeks he has been increasingly anxious and depressed.  He has recently moved back into the dorm since classes started in January.  He reports his roommate Matt who he maintained a close friendship with had to drop out of  school after losing his scholarship.  The patient has taken this very hard as this was a person he could talk to openly.  Since losing his roommate he has felt increasingly depressed and anxious.  He also reports that he has a hard time seeing things clearly while he is at school and he feels overwhelmed and stressed.  He also reports a Global lack of interest in activities.  He reports he has difficulty staying motivated and interested in activities outside of school.  We discussed starting Lexapro 10 mg in conjunction with psychotherapy to help lift mood and lessen anxiety while at school.      Subjective      Review of Systems:   Review of Systems   Psychiatric/Behavioral: Positive for depressed mood and stress. Negative for hallucinations, self-injury and suicidal ideas. The patient is nervous/anxious.        PHQ-9 Depression Screening Score:       Patient History:   The following portions of the patient's history were reviewed and updated as appropriate: allergies, current medications, past family history, past medical history, past social history, past surgical history and problem list.     Social:   Back in the dorm, now living alone.  Classes started in January.  [employer] in the [employer].    Medications:     Current Outpatient Medications:   •  cetirizine (zyrTEC) 10 MG tablet, Take 1 tablet by mouth Daily., Disp: 30 tablet, Rfl: 5  •  escitalopram (Lexapro) 10 MG tablet, Take 1 tablet by mouth Daily., Disp: 30 tablet, Rfl: 2  •  fluticasone (Flonase) 50 MCG/ACT nasal spray, 2 sprays into the nostril(s) as directed by provider Daily., Disp: 16 g, Rfl: 2  •  propranolol (INDERAL) 10 MG tablet, Take 1-2 tablets by mouth 3 (Three) Times a Day As Needed (for situational anxiety)., Disp: 60 tablet, Rfl: 1    Objective     Physical Exam:  Vital Signs: There were no vitals filed for this visit.  There is no height or weight on file to calculate BMI.     Mental Status Exam:   Appearance: Slightly  overweight, appears stated age dressed appropriately to situation and weather  Hygiene: Good, well kept  Cooperation: Good  Eye Contact: Within normal limits  Psychomotor Behavior: Within normal limits  Mood: Depressed  Affect: Congruent with stated mood  Speech: Normal rate, tone and prosody  Thought Process: Linear, goal directed  Thought Content: Within normal limits  Suicidal: Denies  Homicidal: Denied  Hallucinations: Denies  Delusion: Denies  Memory: Within normal limits  Orientation: X4  Reliability: Good  Insight: Good  Judgement: Good  Impulse Control: Good    Assessment / Plan      Visit Diagnosis/Orders Placed This Visit:  Diagnoses and all orders for this visit:    1. Mild episode of recurrent major depressive disorder (HCC) (Primary)  -     escitalopram (Lexapro) 10 MG tablet; Take 1 tablet by mouth Daily.  Dispense: 30 tablet; Refill: 2    2. Generalized anxiety disorder  -     escitalopram (Lexapro) 10 MG tablet; Take 1 tablet by mouth Daily.  Dispense: 30 tablet; Refill: 2    3. Social anxiety disorder         Plan:   1. Start Lexapro 10 mg tablets, 1/2 tablet daily for 1 week then increase to 1 tablet daily  2. Continue propranolol 10 mg as needed    Continue supportive psychotherapy efforts and medications as indicated. Treatment and medication options discussed during today's visit. Patient ackowledged and verbally consented to continue with current treatment plan and was educated on the importance of compliance with treatment and follow-up appointments. Patient seems reasonably able to adhere to treatment plan.      Medication Considerations:  Discussed medication options and treatment plan of prescribed medication(s) as well as the risks, benefits, and potential side effects. Patient is agreeable to call the office with any worsening of symptoms or onset of side effects. Patient is agreeable to call 911 or go to the nearest ER should he/she begin having SI/HI.    Therapy Plan:     Need for  therapy based on depression, anxiety and social anxiety.    Description of Therapy:   55 minutes spent engaged in supportive psychotherapy with the patient. Mental health diagnoses were addressed in therapy. Utilized supportive approach to engage patient in developing a safe space for patient to process presenting concerns. Clarified presenting problem; employed motivational interviewing techniques to gauge change readiness and devise treatment goals and objectives. Engaged client through insight-oriented approach to create further understanding of client's patterns. Utilized empathy and positive regard to encourage continued development of therapeutic relationship.     Session Started: 4:20 PM  Session Ended: 5:15 PM    Topics Discussed:   We discussed his increasing amount of stress and worsening depression and anxiety since returning to school.  We discussed his social anxiety as well as his density to compare himself to others leading to depression and anxiety.      Quality Measures:   Never smoker    I advised Justice of the risks of tobacco use.     Follow Up:   No follow-ups on file.      NONA Morris, PMHNP-BC

## 2022-01-25 ENCOUNTER — TELEMEDICINE (OUTPATIENT)
Dept: BEHAVIORAL HEALTH | Facility: CLINIC | Age: 22
End: 2022-01-25

## 2022-01-25 DIAGNOSIS — F40.10 SOCIAL ANXIETY DISORDER: ICD-10-CM

## 2022-01-25 DIAGNOSIS — F33.0 MILD EPISODE OF RECURRENT MAJOR DEPRESSIVE DISORDER: Primary | ICD-10-CM

## 2022-01-25 PROCEDURE — 99214 OFFICE O/P EST MOD 30 MIN: CPT

## 2022-01-25 PROCEDURE — 90836 PSYTX W PT W E/M 45 MIN: CPT

## 2022-01-26 NOTE — PROGRESS NOTES
Video Visit      Patient Name: Justice Pierce  : 2000   MRN: [id number]     Referring Physician: Julio Cesar Garcia APRN    Chief Complaint:      ICD-10-CM ICD-9-CM   1. Mild episode of recurrent major depressive disorder (HCC)  F33.0 296.31   2. Social anxiety disorder  F40.10 300.23        This provider is located at [facility], using a secure [employer] Video Visit through [employer]. Justice Pierce is being seen remotely via telehealth at their home address in [place], and stated they are in a secure environment for this session. The patient's condition being diagnosed/treated is appropriate for telemedicine. I NONA Mcneill identified myself as well as my credentials.   The patient, and/or patients guardian, consent to be seen remotely, and when consent is given they understand that the consent allows for patient identifiable information to be sent to a third party as needed.   They may refuse to be seen remotely at any time. The electronic data is encrypted and password protected, and the patient and/or guardian has been advised of the potential risks to privacy not withstanding such measures.     You have chosen to receive care through a telehealth visit.  Do you consent to use a video/audio connection for your medical care today?  Yes  This visit complies with patient safety concerns in accordance with [facility] recommendations.    History of Present Illness: Justice Pierce is a 21 y.o. male who I spoke with on video visit today for follow-up and psychotherapy related to depression, anxiety and social anxiety.  The patient reports that since last visit his symptoms are largely the same.  He is reporting continued feelings of low interest, low motivation and low self-esteem.  Is also still experiencing anxiety and social anxiety.  He has been taking Lexapro 5 mg since prescribed, informed him to start taking 10 mg now.      Subjective   Review of Systems:   Review  of Systems   Psychiatric/Behavioral: Positive for depressed mood and stress. Negative for self-injury and suicidal ideas. The patient is nervous/anxious.        PHQ-9 Depression Screening Score:       Patient History:   The following portions of the patient's history were reviewed and updated as appropriate: allergies, current medications, past family history, past medical history, past social history, past surgical history and problem list.     Social:  Back and glasses, experiencing significant stress with the semesters classes.    Medications:     Current Outpatient Medications:   •  cetirizine (zyrTEC) 10 MG tablet, Take 1 tablet by mouth Daily., Disp: 30 tablet, Rfl: 5  •  escitalopram (Lexapro) 10 MG tablet, Take 1 tablet by mouth Daily., Disp: 30 tablet, Rfl: 2  •  fluticasone (Flonase) 50 MCG/ACT nasal spray, 2 sprays into the nostril(s) as directed by provider Daily., Disp: 16 g, Rfl: 2  •  propranolol (INDERAL) 10 MG tablet, Take 1-2 tablets by mouth 3 (Three) Times a Day As Needed (for situational anxiety)., Disp: 60 tablet, Rfl: 1    Objective     Physical Exam:  Vital Signs: There were no vitals filed for this visit.  There is no height or weight on file to calculate BMI.     Mental Status Exam:   Appearance: Well-nourished, dressed appropriately to situation and weather  Hygiene: Good, no apparent issues  Cooperation: Good  Eye Contact: Within normal limits  Psychomotor Behavior: Within normal limits  Mood: Down  Affect: Pleasant, congruent with stated mood  Speech: Normal rate, tone and prosody  Thought Process: Linear, goal-directed  Thought Content: Within normal limits  Suicidal: Denies  Homicidal: Denies  Hallucinations: Denies  Delusion: Denies  Memory: Within normal limits  Orientation: X4  Reliability: Good  Insight: Good  Judgement: Good  Impulse Control: Good    Assessment / Plan      Visit Diagnosis/Orders Placed This Visit:  Diagnoses and all orders for this visit:    1. Mild episode of  recurrent major depressive disorder (HCC) (Primary)    2. Social anxiety disorder         Differential:   None current    Plan:   1. Increase Lexapro dose to 10 mg daily  2. Continue propranolol 10 to 20 mg up to 2 times daily as needed for situational anxiety  3. Continue psychotherapy    Continue supportive psychotherapy efforts and medications as indicated. Treatment and medication options discussed during today's visit. Patient ackowledged and verbally consented to continue with current treatment plan and was educated on the importance of compliance with treatment and follow-up appointments. Patient seems reasonably able to adhere to treatment plan.      Medication Considerations:  Discussed medication options and treatment plan of prescribed medication(s) as well as the risks, benefits, and potential side effects. Patient is agreeable to call the office with any worsening of symptoms or onset of side effects. Patient is agreeable to call 911 or go to the nearest ER should he/she begin having SI/HI.    Therapy Plan:     Need for therapy based on depression, anxiety and situational/social anxiety.    Description of Therapy:   40 minutes spent engaged in supportive psychotherapy with the patient. Mental health diagnoses were addressed in therapy. Utilized supportive approach to engage patient in developing a safe space for patient to process presenting concerns. Clarified presenting problem; employed motivational interviewing techniques to gauge change readiness and devise treatment goals and objectives. Engaged client through insight-oriented approach to create further understanding of client's patterns. Utilized empathy and positive regard to encourage continued development of therapeutic relationship.     Session Started: 3:35 PM  Session Ended: 4:15 PM    Topics Discussed:   We discussed his current stressors in school.  We also discussed his desire to make more emotional friendships and is feeling that there  is a pattern of people not reciprocating friendships towards him.  We spoke about his social anxiety and his propensity to compare himself to others which leads to him feeling dejected.  We spoke about possibilities of reaching out to potential friends including 2 friends in his class (Haritha and Alexei).    Quality Measures:   Never smoker    I advised Justice Pierce of the risks of tobacco use.     Follow Up:   No follow-ups on file.      NONA Morris, PMHNP-BC

## 2022-02-17 ENCOUNTER — TELEMEDICINE (OUTPATIENT)
Dept: BEHAVIORAL HEALTH | Facility: CLINIC | Age: 22
End: 2022-02-17

## 2022-02-17 DIAGNOSIS — F33.0 MILD EPISODE OF RECURRENT MAJOR DEPRESSIVE DISORDER: Primary | ICD-10-CM

## 2022-02-17 DIAGNOSIS — F40.10 SOCIAL ANXIETY DISORDER: ICD-10-CM

## 2022-02-17 PROCEDURE — 90838 PSYTX W PT W E/M 60 MIN: CPT

## 2022-02-17 PROCEDURE — 99214 OFFICE O/P EST MOD 30 MIN: CPT

## 2022-02-17 NOTE — PROGRESS NOTES
"     Office  Follow Up Visit      Patient Name: Justice Pierce  : 2000   MRN: [id number]     Referring Provider: Julio Cesar Garcia APRN    Chief Complaint: Follow-up related to:    ICD-10-CM ICD-9-CM   1. Mild episode of recurrent major depressive disorder (HCC)  F33.0 296.31   2. Social anxiety disorder  F40.10 300.23       This provider is located at [facility], using a secure [employer] Video Visit through [employer]. Jsutice Pierce is being seen remotely via telehealth at their home address in [place], and stated they are in a secure environment for this session. The patient's condition being diagnosed/treated is appropriate for telemedicine. I NONA Mcneill identified myself as well as my credentials.   The patient, and/or patients guardian, consent to be seen remotely, and when consent is given they understand that the consent allows for patient identifiable information to be sent to a third party as needed.   They may refuse to be seen remotely at any time. The electronic data is encrypted and password protected, and the patient and/or guardian has been advised of the potential risks to privacy not withstanding such measures.     You have chosen to receive care through a telehealth visit.  Do you consent to use a video/audio connection for your medical care today?  Yes  This visit complies with patient safety concerns in accordance with CDC recommendations.    History of Present Illness: Justice Pierce is a 21 y.o. male who I spoke with on video visit today for follow-up and psychotherapy related to depression and social anxiety.  The patient reports that the last week has been \"the lowest have ever been\".  The patient reports that he is facing significant stress at school including possibly needing to withdrawal from a course (physics 2).  He is worried about this setting him back academically as well as the stigma associated with failure.  He does report that it " "is possible that he is less nervous since starting Lexapro.  He reports feeling an \"acceptance\" when leading up to his exam.  He is little concerned that this may be some emotional dulling which we will continue to rule out together.        Subjective      Review of Systems:   Review of Systems   Psychiatric/Behavioral: Positive for depressed mood. Negative for self-injury and suicidal ideas. The patient is nervous/anxious.        PHQ-9 Depression Screening Score:       Patient History:   The following portions of the patient's history were reviewed and updated as appropriate: allergies, current medications, past family history, past medical history, past social history, past surgical history and problem list.     Social:   Having stress in school, possibly needing to withdrawal from physics 2.    Medications:     Current Outpatient Medications:   •  cetirizine (zyrTEC) 10 MG tablet, Take 1 tablet by mouth Daily., Disp: 30 tablet, Rfl: 5  •  escitalopram (Lexapro) 10 MG tablet, Take 1 tablet by mouth Daily., Disp: 30 tablet, Rfl: 2  •  fluticasone (Flonase) 50 MCG/ACT nasal spray, 2 sprays into the nostril(s) as directed by provider Daily., Disp: 16 g, Rfl: 2  •  propranolol (INDERAL) 10 MG tablet, Take 1-2 tablets by mouth 3 (Three) Times a Day As Needed (for situational anxiety)., Disp: 60 tablet, Rfl: 1    Objective     Physical Exam:  Vital Signs: There were no vitals filed for this visit.  There is no height or weight on file to calculate BMI.     Mental Status Exam:   Appearance: Well-nourished young adult male, appears stated age, dressed appropriately to situation and weather  Hygiene: Good, well kept  Cooperation: Good  Eye Contact: Within normal limits  Psychomotor Behavior: Within normal limits  Mood: Down  Affect: Congruent with mood  Speech: Normal rate, tone and prosody  Thought Process: Linear, goal-directed  Thought Content: Preoccupied with school anxiety  Suicidal: Denies  Homicidal: " Denies  Hallucinations: Denies  Delusion: Denies  Memory: Within normal limits  Orientation: X4  Reliability: Good  Insight: Good  Judgement: Good  Impulse Control: No apparent or reported issues    Assessment / Plan      Visit Diagnosis/Orders Placed This Visit:  Diagnoses and all orders for this visit:    1. Mild episode of recurrent major depressive disorder (HCC) (Primary)    2. Social anxiety disorder         Plan:   1. Continue Lexapro 10 mg daily  2. Continue propranolol 10 to 20 mg up to 3 times daily as needed  3. Continue psychotherapy    Continue supportive psychotherapy efforts and medications as indicated. Treatment and medication options discussed during today's visit. Patient ackowledged and verbally consented to continue with current treatment plan and was educated on the importance of compliance with treatment and follow-up appointments. Patient seems reasonably able to adhere to treatment plan.      Medication Considerations:  Discussed medication options and treatment plan of prescribed medication(s) as well as the risks, benefits, and potential side effects. Patient is agreeable to call the office with any worsening of symptoms or onset of side effects. Patient is agreeable to call 911 or go to the nearest ER should he/she begin having SI/HI.    Therapy Plan:     Need for therapy based on social anxiety and depression    Description of Therapy:   80 minutes spent engaged in supportive psychotherapy with the patient. Mental health diagnoses were addressed in therapy. Utilized supportive approach to engage patient in developing a safe space for patient to process presenting concerns. Clarified presenting problem; employed motivational interviewing techniques to gauge change readiness and devise treatment goals and objectives. Engaged client through insight-oriented approach to create further understanding of client's patterns. Utilized empathy and positive regard to encourage continued development  of therapeutic relationship.     Session Started: 4 PM  Session Ended: 5:20 PM    Topics Discussed:   We discussed his anxiety about his academic performance.  We discussed his fear of failure as well as his propensity to compare himself to others.  He feels as if he is not as intelligent as other people particularly Haritha and Luke in his class.  We discussed his feelings of loneliness and how it is difficult for him to connect with others.  We discussed how he is interested in more existential topics, and less interested in more mundane social topics which leads to him feeling disconnected and dejected in social situations.  We discussed his pattern of embellishing himself and being dishonest about his thoughts and interest in order to fit in.  We discussed strategies and how to establish more honest relationships and challenges to doing so.    Quality Measures:   Never smoker    I advised Justice of the risks of tobacco use.     Follow Up:   Return in about 1 week (around 2/24/2022).      NONA Morris, PMHNP-BC

## 2022-02-24 ENCOUNTER — TELEMEDICINE (OUTPATIENT)
Dept: BEHAVIORAL HEALTH | Facility: CLINIC | Age: 22
End: 2022-02-24

## 2022-02-24 DIAGNOSIS — F41.1 GENERALIZED ANXIETY DISORDER: ICD-10-CM

## 2022-02-24 DIAGNOSIS — F40.10 SOCIAL ANXIETY DISORDER: ICD-10-CM

## 2022-02-24 DIAGNOSIS — F33.0 MILD EPISODE OF RECURRENT MAJOR DEPRESSIVE DISORDER: Primary | ICD-10-CM

## 2022-02-24 PROCEDURE — 99214 OFFICE O/P EST MOD 30 MIN: CPT

## 2022-02-24 PROCEDURE — 90838 PSYTX W PT W E/M 60 MIN: CPT

## 2022-02-24 NOTE — PROGRESS NOTES
"     Video Visit      Patient Name: Justice Pierce  : 2000   MRN: [id number]     Referring Physician: Julio Cesar Garcia APRN    Chief Complaint:      ICD-10-CM ICD-9-CM   1. Mild episode of recurrent major depressive disorder (HCC)  F33.0 296.31   2. Social anxiety disorder  F40.10 300.23   3. Generalized anxiety disorder  F41.1 300.02        This provider is located at [facility], using a secure [id number] Video Visit through [employer]. Justice Pierce is being seen remotely via telehealth at their home address in [place], and stated they are in a secure environment for this session. The patient's condition being diagnosed/treated is appropriate for telemedicine. I NONA Mcneill identified myself as well as my credentials.   The patient, and/or patients guardian, consent to be seen remotely, and when consent is given they understand that the consent allows for patient identifiable information to be sent to a third party as needed.   They may refuse to be seen remotely at any time. The electronic data is encrypted and password protected, and the patient and/or guardian has been advised of the potential risks to privacy not withstanding such measures.     You have chosen to receive care through a telehealth visit.  Do you consent to use a video/audio connection for your medical care today?  Yes  This visit complies with patient safety concerns in accordance with CDC recommendations.    History of Present Illness: Justice Pierce is a 21 y.o. male who I spoke with on video visit today for follow-up and psychotherapy related to depression and anxiety.  The patient reports that since starting Lexapro he has noticed a decrease in his anxiety and his propensity to \"freak out\" about things.  He also reports it has been easier for him to get over things.  He still has some low mood and anxiety specifically related to school performance and interpersonal " relationships.      Subjective   Review of Systems:   Review of Systems   Psychiatric/Behavioral: Positive for depressed mood and stress. Negative for self-injury and suicidal ideas. The patient is nervous/anxious.        PHQ-9 Depression Screening Score:       Patient History:   The following portions of the patient's history were reviewed and updated as appropriate: allergies, current medications, past family history, past medical history, past social history, past surgical history and problem list.     Social:  No significant update    Medications:     Current Outpatient Medications:   •  cetirizine (zyrTEC) 10 MG tablet, Take 1 tablet by mouth Daily., Disp: 30 tablet, Rfl: 5  •  escitalopram (Lexapro) 10 MG tablet, Take 1 tablet by mouth Daily., Disp: 30 tablet, Rfl: 2  •  fluticasone (Flonase) 50 MCG/ACT nasal spray, 2 sprays into the nostril(s) as directed by provider Daily., Disp: 16 g, Rfl: 2  •  propranolol (INDERAL) 10 MG tablet, Take 1-2 tablets by mouth 3 (Three) Times a Day As Needed (for situational anxiety)., Disp: 60 tablet, Rfl: 1    Objective     Physical Exam:  Vital Signs: There were no vitals filed for this visit.  There is no height or weight on file to calculate BMI.     Mental Status Exam:   Appearance: Well-nourished young adult male, appears stated age, dressed appropriately to situation and weather  Hygiene: Good,  Cooperation: Good  Eye Contact: Within normal limits  Psychomotor Behavior: Within normal limits  Mood: Down at times  Affect: Pleasant, full range of affect, notably brighter at this week  Speech: Normal rate, tone and prosody  Thought Process: Linear, goal-directed  Thought Content: Within normal limits  Suicidal: Denies  Homicidal: Denies  Hallucinations: Denies  Delusion: Denies  Memory: Within normal limits  Orientation: X4  Reliability: Good  Insight: Good  Judgement: Good  Impulse Control: No apparent reported issues    Assessment / Plan      Visit Diagnosis/Orders Placed  This Visit:  Diagnoses and all orders for this visit:    1. Mild episode of recurrent major depressive disorder (HCC) (Primary)    2. Social anxiety disorder    3. Generalized anxiety disorder         Differential:   Rule out normalcy related to difficulty relating with peers, loneliness    Plan:   1. Continue Lexapro 10 mg daily  2. Continue propranolol 10 mg up to 3 times daily as needed for situational anxiety    Therapy Plan:     Need for therapy based on depression, anxiety and loneliness    Description of Therapy:   60 minutes spent engaged in supportive psychotherapy with the patient. Mental health diagnoses were addressed in therapy. Utilized supportive approach to engage patient in developing a safe space for patient to process presenting concerns. Clarified presenting problem; employed motivational interviewing techniques to gauge change readiness and devise treatment goals and objectives. Engaged client through insight-oriented approach to create further understanding of client's patterns. Utilized empathy and positive regard to encourage continued development of therapeutic relationship.     Session Started: 3:55 PM  Session Ended: 4:55 PM    Topics Discussed:   We discussed social anxiety and his propensity to think deeply about others perceptions of him.  We discussed his propensity to compare himself to others and think negatively about his own performance.  Discussed his difficulty connecting with peers and how this may be related to different interests and intellectual ability.  We discussed his feelings of loneliness and how he does not seem to connect with others in the same way that they seem to.      Continue supportive psychotherapy efforts and medications as indicated. Treatment and medication options discussed during today's visit. Patient ackowledged and verbally consented to continue with current treatment plan and was educated on the importance of compliance with treatment and follow-up  appointments. Patient seems reasonably able to adhere to treatment plan.      Medication Considerations:  Discussed medication options and treatment plan of prescribed medication(s) as well as the risks, benefits, and potential side effects. Patient is agreeable to call the office with any worsening of symptoms or onset of side effects. Patient is agreeable to call 911 or go to the nearest ER should he/she begin having SI/HI.    Quality Measures:   Never smoker    I advised Justice Pierce of the risks of tobacco use.     Follow Up:   Return in about 1 week (around 3/3/2022) for Therapy Follow Up.      NONA Morris, PMHNP-BC

## 2022-03-02 ENCOUNTER — TELEMEDICINE (OUTPATIENT)
Dept: BEHAVIORAL HEALTH | Facility: CLINIC | Age: 22
End: 2022-03-02

## 2022-03-02 DIAGNOSIS — F33.0 MILD EPISODE OF RECURRENT MAJOR DEPRESSIVE DISORDER: Primary | ICD-10-CM

## 2022-03-02 DIAGNOSIS — F41.8 SITUATIONAL ANXIETY: ICD-10-CM

## 2022-03-02 DIAGNOSIS — F40.10 SOCIAL ANXIETY DISORDER: ICD-10-CM

## 2022-03-02 PROCEDURE — 99214 OFFICE O/P EST MOD 30 MIN: CPT

## 2022-03-02 PROCEDURE — 90838 PSYTX W PT W E/M 60 MIN: CPT

## 2022-03-02 NOTE — PROGRESS NOTES
"     Office  Follow Up Visit      Patient Name: Justice Pierce  : 2000   MRN: [id number]     Referring Provider: Julio Cesar Garcia APRN    Chief Complaint: Follow-up and psychotherapy related to:    ICD-10-CM ICD-9-CM   1. Mild episode of recurrent major depressive disorder (HCC)  F33.0 296.31   2. Social anxiety disorder  F40.10 300.23   3. Situational anxiety  F41.8 300.09        This provider is located at [facility], using a secure [employer] Video Visit through [email or web address]. Justice Pierce is being seen remotely via telehealth at their home address in [place], and stated they are in a secure environment for this session. The patient's condition being diagnosed/treated is appropriate for telemedicine. I NONA Mcneill identified myself as well as my credentials.   The patient, and/or patients guardian, consent to be seen remotely, and when consent is given they understand that the consent allows for patient identifiable information to be sent to a third party as needed.   They may refuse to be seen remotely at any time. The electronic data is encrypted and password protected, and the patient and/or guardian has been advised of the potential risks to privacy not withstanding such measures.     You have chosen to receive care through a telehealth visit.  Do you consent to use a video/audio connection for your medical care today?  Yes  This visit complies with patient safety concerns in accordance with CDC recommendations.    History of Present Illness: Justice Pierce is a 21 y.o. male who I spoke with on video visit today for follow-up and psychotherapy related to depression, anxiety and social anxiety.  The patient reports that since last visit symptoms of depression and anxiety have been about the same.  He does report he may be having some side effects to Lexapro.  He reports approximately 2 nights in the last week he has had \"weird dreams\" which have been anxiety " "provoking.  He also reports that he has been waking from sleep \"drenched in sweat\" on nights he has these dreams.  We discussed the propensity for serotonergic agents to affect dreams and discussed giving it some time to see if side effects resolved.  Of note, the patient reports he has had issues sleeping for a long time particularly falling asleep.  He has been taking 5 mg of melatonin over-the-counter for several years.    Subjective      Review of Systems:   Review of Systems   Psychiatric/Behavioral: Positive for sleep disturbance, depressed mood and stress. Negative for self-injury and suicidal ideas. The patient is nervous/anxious.        PHQ-9 Depression Screening Score:       Patient History:   The following portions of the patient's history were reviewed and updated as appropriate: allergies, current medications, past family history, past medical history, past social history, past surgical history and problem list.     Social:   No significant update    Medications:     Current Outpatient Medications:   •  cetirizine (zyrTEC) 10 MG tablet, Take 1 tablet by mouth Daily., Disp: 30 tablet, Rfl: 5  •  escitalopram (Lexapro) 10 MG tablet, Take 1 tablet by mouth Daily., Disp: 30 tablet, Rfl: 2  •  fluticasone (Flonase) 50 MCG/ACT nasal spray, 2 sprays into the nostril(s) as directed by provider Daily., Disp: 16 g, Rfl: 2  •  propranolol (INDERAL) 10 MG tablet, Take 1-2 tablets by mouth 3 (Three) Times a Day As Needed (for situational anxiety)., Disp: 60 tablet, Rfl: 1    Objective     Physical Exam:  Vital Signs: There were no vitals filed for this visit.  There is no height or weight on file to calculate BMI.     Mental Status Exam:   Appearance: Well-nourished young adult male, appears stated age, dressed appropriately to situation and weather  Hygiene: Good, well kept  Cooperation: Good  Eye Contact: Within normal limit  Psychomotor Behavior: Within normal limits  Mood: Down  Affect: Congruent  Speech: Normal " rate, tone and prosody  Thought Process: Linear, goal-directed  Thought Content: Within normal limits  Suicidal: Denies  Homicidal: Denies  Hallucinations: Denies  Delusion: Denies  Memory: Within normal limits  Orientation: X4  Reliability: Good  Insight: Good  Judgement: Good  Impulse Control: No apparent reported issues    Assessment / Plan      Visit Diagnosis/Orders Placed This Visit:  Diagnoses and all orders for this visit:    1. Mild episode of recurrent major depressive disorder (HCC) (Primary)    2. Social anxiety disorder    3. Situational anxiety         Plan:   1. Continue Lexapro 10 mg daily  2. Continue propranolol 10 to 20 mg up to 2 times daily as needed  3. Increase over-the-counter melatonin to 10 mg nightly, patient to take 2 hours prior to falling asleep  4. Continue psychotherapy    Continue supportive psychotherapy efforts and medications as indicated. Treatment and medication options discussed during today's visit. Patient ackowledged and verbally consented to continue with current treatment plan and was educated on the importance of compliance with treatment and follow-up appointments. Patient seems reasonably able to adhere to treatment plan.      Medication Considerations:  Discussed medication options and treatment plan of prescribed medication(s) as well as the risks, benefits, and potential side effects. Patient is agreeable to call the office with any worsening of symptoms or onset of side effects. Patient is agreeable to call 911 or go to the nearest ER should he/she begin having SI/HI.    Therapy Plan:     Need for therapy based on depression, anxiety, social anxiety.    Description of Therapy:   60 minutes spent engaged in supportive psychotherapy with the patient. Mental health diagnoses were addressed in therapy. Utilized supportive approach to engage patient in developing a safe space for patient to process presenting concerns. Clarified presenting problem; employed motivational  "interviewing techniques to gauge change readiness and devise treatment goals and objectives. Engaged client through insight-oriented approach to create further understanding of client's patterns. Utilized empathy and positive regard to encourage continued development of therapeutic relationship.     Session Started: 2:30 PM  Session Ended: 3:30 PM    Topics Discussed:   We discussed his anxiety particularly social anxiety.  We discussed his propensity to over analyze and \"manage\" social interactions.  We discussed his difficulty in being vulnerable in interpersonal relationships which may be contributing to feelings of isolation and loneliness.  We discussed strategies of being more vulnerable in social interactions which she is to practice this week.    Quality Measures:   Never smoker    I advised Justice of the risks of tobacco use.     Follow Up:   Return in about 1 week (around 3/9/2022) for Therapy Follow Up.      NONA Morris, PMHNP-BC   "

## 2022-03-05 DIAGNOSIS — H65.01 NON-RECURRENT ACUTE SEROUS OTITIS MEDIA OF RIGHT EAR: ICD-10-CM

## 2022-03-07 RX ORDER — FLUTICASONE PROPIONATE 50 MCG
SPRAY, SUSPENSION (ML) NASAL
Qty: 16 G | Refills: 2 | Status: SHIPPED | OUTPATIENT
Start: 2022-03-07 | End: 2022-06-22 | Stop reason: SDUPTHER

## 2022-03-08 ENCOUNTER — TELEMEDICINE (OUTPATIENT)
Dept: BEHAVIORAL HEALTH | Facility: CLINIC | Age: 22
End: 2022-03-08

## 2022-03-08 DIAGNOSIS — F40.10 SOCIAL ANXIETY DISORDER: ICD-10-CM

## 2022-03-08 DIAGNOSIS — F33.0 MILD EPISODE OF RECURRENT MAJOR DEPRESSIVE DISORDER: Primary | ICD-10-CM

## 2022-03-08 PROCEDURE — 90837 PSYTX W PT 60 MINUTES: CPT

## 2022-03-09 NOTE — PROGRESS NOTES
Office  Follow Up Visit      Patient Name: Justice Pierce  : 2000   MRN: [id number]     Referring Provider: Julio Cesar Garcai APRN    Chief Complaint: Psychotherapy related to:    ICD-10-CM ICD-9-CM   1. Mild episode of recurrent major depressive disorder (HCC)  F33.0 296.31   2. Social anxiety disorder  F40.10 300.23        History of Present Illness:   Justice Pierce is a 21 y.o. male who is here today for psychotherapy related to depression, situational anxiety and social anxiety.  The patient reports he continues to struggle with depressive and anxiety symptoms which are highly correlated to social situations, interpersonal relationships and academic performance.      Subjective      Review of Systems:   Review of Systems    PHQ-9 Depression Screening Score:       Patient History:   The following portions of the patient's history were reviewed and updated as appropriate: allergies, current medications, past family history, past medical history, past social history, past surgical history and problem list.     Social:   No significant update    Medications:     Current Outpatient Medications:   •  cetirizine (zyrTEC) 10 MG tablet, Take 1 tablet by mouth Daily., Disp: 30 tablet, Rfl: 5  •  escitalopram (Lexapro) 10 MG tablet, Take 1 tablet by mouth Daily., Disp: 30 tablet, Rfl: 2  •  fluticasone (FLONASE) 50 MCG/ACT nasal spray, INSTILL 2 SPRAYS INTO THE NOSTRILS AS DIRECTED BY PROVIDER DAILY, Disp: 16 g, Rfl: 2  •  propranolol (INDERAL) 10 MG tablet, Take 1-2 tablets by mouth 3 (Three) Times a Day As Needed (for situational anxiety)., Disp: 60 tablet, Rfl: 1    Objective     Physical Exam:  Vital Signs: There were no vitals filed for this visit.  There is no height or weight on file to calculate BMI.     Mental Status Exam:   Appearance: Well-nourished young adult male, appears stated age, dressed appropriately to situation and weather, in darkened room  Hygiene: Good, well-kept  Cooperation: Good  Eye  "Contact: Fair  Psychomotor Behavior: Within normal limits  Mood: \"Okay\"  Affect: Congruent  Speech: Normal rate, tone and prosody  Thought Process: Within normal limits  Thought Content: Within normal limits  Suicidal: Denies  Homicidal: Denies  Hallucinations: Denies  Delusion: Denies  Memory: Within normal limits  Orientation: X4  Reliability: Good  Insight: Good  Judgement: Good  Impulse Control: No apparent or reported issues    Assessment / Plan      Visit Diagnosis/Orders Placed This Visit:  Diagnoses and all orders for this visit:    1. Mild episode of recurrent major depressive disorder (HCC) (Primary)    2. Social anxiety disorder         Plan:   1. Continue Lexapro 10 mg daily  2. Continue propranolol 10 to 20 mg up to 2 times daily as needed for situational anxiety  3. Continue psychotherapy    Continue supportive psychotherapy efforts and medications as indicated. Treatment and medication options discussed during today's visit. Patient ackowledged and verbally consented to continue with current treatment plan and was educated on the importance of compliance with treatment and follow-up appointments. Patient seems reasonably able to adhere to treatment plan.      Medication Considerations:  Discussed medication options and treatment plan of prescribed medication(s) as well as the risks, benefits, and potential side effects. Patient is agreeable to call the office with any worsening of symptoms or onset of side effects. Patient is agreeable to call 911 or go to the nearest ER should he/she begin having SI/HI.    Therapy Plan:     Need for therapy based on depression, anxiety    Description of Therapy:   60 minutes spent engaged in supportive psychotherapy with the patient. Mental health diagnoses were addressed in therapy. Utilized supportive approach to engage patient in developing a safe space for patient to process presenting concerns. Clarified presenting problem; employed motivational interviewing " techniques to gauge change readiness and devise treatment goals and objectives. Engaged client through insight-oriented approach to create further understanding of client's patterns. Utilized empathy and positive regard to encourage continued development of therapeutic relationship.     Session Started: 4:30 PM  Session Ended: 5:30 PM    Topics Discussed:   We discussed the patient's symptoms of depression including low mood, poor self-esteem.  We also discussed his social and situational anxiety.  We discussed his anxiety related to interpersonal relationships and how he feels distant from his current friends.   We discussed his propensity to embellish or misrepresent himself to avoid embarrassment/judgment and how this may be getting in the way of close friendships.We discussed strategies and opportunities to become more emotionally close/vulnerable with those around him.     Quality Measures:   Never smoker    I advised Justice of the risks of tobacco use.     Follow Up:   Return in about 1 week (around 3/15/2022) for Therapy Follow Up.      NONA Morris, PMHNP-BC

## 2022-03-14 ENCOUNTER — TELEMEDICINE (OUTPATIENT)
Dept: BEHAVIORAL HEALTH | Facility: CLINIC | Age: 22
End: 2022-03-14

## 2022-03-14 DIAGNOSIS — F41.8 SITUATIONAL ANXIETY: ICD-10-CM

## 2022-03-14 DIAGNOSIS — F40.10 SOCIAL ANXIETY DISORDER: ICD-10-CM

## 2022-03-14 DIAGNOSIS — F33.0 MILD EPISODE OF RECURRENT MAJOR DEPRESSIVE DISORDER: Primary | ICD-10-CM

## 2022-03-14 PROCEDURE — 90838 PSYTX W PT W E/M 60 MIN: CPT

## 2022-03-14 PROCEDURE — 99214 OFFICE O/P EST MOD 30 MIN: CPT

## 2022-03-14 NOTE — PROGRESS NOTES
"     Office  Follow Up Visit      Patient Name: Justice Pierce  : 2000   MRN: [id number]     Referring Provider: Julio Cesar Garcia APRN    Chief Complaint: Follow-up and psychotherapy related to:    ICD-10-CM ICD-9-CM   1. Mild episode of recurrent major depressive disorder (HCC)  F33.0 296.31   2. Social anxiety disorder  F40.10 300.23   3. Situational anxiety  F41.8 300.09        This provider is located at [facility], using a secure [employer] Video Visit through [employer]. Justice Pierce is being seen remotely via telehealth at their home address in [place], and stated they are in a secure environment for this session. The patient's condition being diagnosed/treated is appropriate for telemedicine. I NONA Mcneill identified myself as well as my credentials.   The patient, and/or patients guardian, consent to be seen remotely, and when consent is given they understand that the consent allows for patient identifiable information to be sent to a third party as needed.   They may refuse to be seen remotely at any time. The electronic data is encrypted and password protected, and the patient and/or guardian has been advised of the potential risks to privacy not withstanding such measures.     You have chosen to receive care through a telehealth visit.  Do you consent to use a video/audio connection for your medical care today?  Yes  This visit complies with patient safety concerns in accordance with CDC recommendations.    History of Present Illness: Justice Pierce is a 21 y.o. male who I spoke with on video visit today for follow-up and psychotherapy related to depression and social/situational anxiety.  The patient reports that since last follow-up his symptoms have been about the same.  He continues to have some mild improvement in mood and anxiety with Lexapro.  He does say he is experiencing \"crazy dreams\" in which he is sometimes having difficulty discerning " dreams from reality in the morning.  He also endorses a still sporadic sleep schedule due to school and daylight savings.      Subjective      Review of Systems:   Review of Systems   Psychiatric/Behavioral: Positive for sleep disturbance and stress. Negative for self-injury and suicidal ideas. The patient is nervous/anxious.        PHQ-9 Depression Screening Score:       Patient History:   The following portions of the patient's history were reviewed and updated as appropriate: allergies, current medications, past family history, past medical history, past social history, past surgical history and problem list.     Social:   Home this week for spring break    Medications:     Current Outpatient Medications:   •  cetirizine (zyrTEC) 10 MG tablet, Take 1 tablet by mouth Daily., Disp: 30 tablet, Rfl: 5  •  escitalopram (Lexapro) 10 MG tablet, Take 1 tablet by mouth Daily., Disp: 30 tablet, Rfl: 2  •  fluticasone (FLONASE) 50 MCG/ACT nasal spray, INSTILL 2 SPRAYS INTO THE NOSTRILS AS DIRECTED BY PROVIDER DAILY, Disp: 16 g, Rfl: 2  •  propranolol (INDERAL) 10 MG tablet, Take 1-2 tablets by mouth 3 (Three) Times a Day As Needed (for situational anxiety)., Disp: 60 tablet, Rfl: 1    Objective     Physical Exam:  Vital Signs: There were no vitals filed for this visit.  There is no height or weight on file to calculate BMI.     Mental Status Exam:   Appearance: Well-nourished young adult male, appears stated age, dressed appropriately to situation and weather  Hygiene: Good, well-kept  Cooperation: Good  Eye Contact: Good  Psychomotor Behavior: Within normal limits  Mood: Euthymic, some stress  Affect: Congruent, pleasant, full range of affect  Speech: Normal rate, tone and prosody  Thought Process: Linear, goal-directed  Thought Content: Within normal limits  Suicidal: Denies  Homicidal: Denies  Hallucinations: Denies  Delusion: Denies  Memory: Within normal limits  Orientation: X4  Reliability: Good  Insight:  Good  Judgement: Good  Impulse Control: No apparent reported issues    Assessment / Plan      Visit Diagnosis/Orders Placed This Visit:  Diagnoses and all orders for this visit:    1. Mild episode of recurrent major depressive disorder (HCC) (Primary)    2. Social anxiety disorder    3. Situational anxiety         Plan:   1. Continue Lexapro 10 mg daily  2. Continue propranolol 10 to 20 mg up to 2 times daily as needed for situational anxiety  3. Continue psychotherapy    Continue supportive psychotherapy efforts and medications as indicated. Treatment and medication options discussed during today's visit. Patient ackowledged and verbally consented to continue with current treatment plan and was educated on the importance of compliance with treatment and follow-up appointments. Patient seems reasonably able to adhere to treatment plan.      Medication Considerations:  Discussed medication options and treatment plan of prescribed medication(s) as well as the risks, benefits, and potential side effects. Patient is agreeable to call the office with any worsening of symptoms or onset of side effects. Patient is agreeable to call 911 or go to the nearest ER should he/she begin having SI/HI.    Therapy Plan:     Need for therapy based on depressive and anxiety symptoms, social anxiety    Description of Therapy:   55 minutes spent engaged in supportive psychotherapy with the patient. Mental health diagnoses were addressed in therapy. Utilized supportive approach to engage patient in developing a safe space for patient to process presenting concerns. Clarified presenting problem; employed motivational interviewing techniques to gauge change readiness and devise treatment goals and objectives. Engaged client through insight-oriented approach to create further understanding of client's patterns. Utilized empathy and positive regard to encourage continued development of therapeutic relationship.     Session Started: 10:05  AM  Session Ended: 11 AM    Topics Discussed:   We discussed his interpersonal relationships and social anxiety.  We discussed him feeling like responding to text messages and going to social events is a chore.  We discussed his propensity to present a front/fall self in social interactions which I believe is significantly contributing to his loneliness.  We discussed strategies for being more vulnerable in himself in conversation.  He is going to lunch with his friend Rob at which he will practice the strategies.    Quality Measures:   Never smoker    I advised Justice of the risks of tobacco use.     Follow Up:   Return in about 1 week (around 3/21/2022) for Therapy Follow Up.      NONA Morris, PMHNP-BC

## 2022-03-31 ENCOUNTER — TELEMEDICINE (OUTPATIENT)
Dept: BEHAVIORAL HEALTH | Facility: CLINIC | Age: 22
End: 2022-03-31

## 2022-03-31 DIAGNOSIS — F33.0 MILD EPISODE OF RECURRENT MAJOR DEPRESSIVE DISORDER: Primary | ICD-10-CM

## 2022-03-31 DIAGNOSIS — F40.10 SOCIAL ANXIETY DISORDER: ICD-10-CM

## 2022-03-31 DIAGNOSIS — F41.8 SITUATIONAL ANXIETY: ICD-10-CM

## 2022-03-31 PROCEDURE — 90838 PSYTX W PT W E/M 60 MIN: CPT

## 2022-03-31 PROCEDURE — 99214 OFFICE O/P EST MOD 30 MIN: CPT

## 2022-03-31 NOTE — PROGRESS NOTES
Office  Follow Up Visit      Patient Name: Justice Pierce  : 2000   MRN: [id number]     Referring Provider: Julio Cesar Garcia APRN    Chief Complaint: Psychotherapy related to:    ICD-10-CM ICD-9-CM   1. Mild episode of recurrent major depressive disorder (HCC)  F33.0 296.31   2. Social anxiety disorder  F40.10 300.23   3. Situational anxiety  F41.8 300.09          This provider is located at [facility], using a secure [employer] Video Visit through [employer]. Justice Pierce is being seen remotely via telehealth at their home address in [place], and stated they are in a secure environment for this session. The patient's condition being diagnosed/treated is appropriate for telemedicine. I NONA Mcneill identified myself as well as my credentials.   The patient, and/or patients guardian, consent to be seen remotely, and when consent is given they understand that the consent allows for patient identifiable information to be sent to a third party as needed.   They may refuse to be seen remotely at any time. The electronic data is encrypted and password protected, and the patient and/or guardian has been advised of the potential risks to privacy not withstanding such measures.     You have chosen to receive care through a telehealth visit.  Do you consent to use a video/audio connection for your medical care today?  Yes  This visit complies with patient safety concerns in accordance with [facility] recommendations.    Interval history:     Justice Pierce is a 21 y.o. male who I spoke with on video visit today for psychotherapy related to depression, social anxiety and situational anxiety.  Patient reports that symptoms of depression, social and situational anxiety are about the same since last visit.  Patient continues to have improved situational (test) anxiety with propranolol.      Subjective      Review of Systems:   Review of Systems   Psychiatric/Behavioral: Positive for  depressed mood and stress. Negative for self-injury and suicidal ideas. The patient is nervous/anxious.        PHQ-9 Depression Screening Score:       Patient History:   The following portions of the patient's history were reviewed and updated as appropriate: allergies, current medications, past family history, past medical history, past social history, past surgical history and problem list.     Social:   No significant update    Medications:     Current Outpatient Medications:   •  cetirizine (zyrTEC) 10 MG tablet, Take 1 tablet by mouth Daily., Disp: 30 tablet, Rfl: 5  •  escitalopram (Lexapro) 10 MG tablet, Take 1 tablet by mouth Daily., Disp: 30 tablet, Rfl: 2  •  fluticasone (FLONASE) 50 MCG/ACT nasal spray, INSTILL 2 SPRAYS INTO THE NOSTRILS AS DIRECTED BY PROVIDER DAILY, Disp: 16 g, Rfl: 2  •  propranolol (INDERAL) 10 MG tablet, Take 1-2 tablets by mouth 3 (Three) Times a Day As Needed (for situational anxiety)., Disp: 60 tablet, Rfl: 1    Objective     Physical Exam:  Vital Signs: There were no vitals filed for this visit.  There is no height or weight on file to calculate BMI.     Mental Status Exam:   Appearance: Well-nourished young adult male, appears stated age, dressed appropriately to situation and weather  Hygiene: Good, well-kept  Cooperation: Good  Eye Contact: Within normal limits  Psychomotor Behavior: Within normal limits  Mood: Okay  Affect: Pleasant, full range of affect  Speech: Normal rate, tone and prosody  Thought Process: Linear, goal-directed  Thought Content: Within normal limits  Suicidal: Denies  Homicidal: Denies  Hallucinations: Denies  Delusion: Denies  Memory: Within normal limits  Orientation: X4  Reliability: Good  Insight: Good  Judgement: Good  Impulse Control: No apparent or reported issues    Assessment / Plan      Visit Diagnosis/Orders Placed This Visit:  Diagnoses and all orders for this visit:    1. Mild episode of recurrent major depressive disorder (HCC)  (Primary)    2. Social anxiety disorder    3. Situational anxiety         Plan:   1. Continue Lexapro 10 mg daily  2. Continue propranolol 10 mg up to 2 times daily as needed  3. Continue psychotherapy    Continue supportive psychotherapy efforts and medications as indicated. Treatment and medication options discussed during today's visit. Patient ackowledged and verbally consented to continue with current treatment plan and was educated on the importance of compliance with treatment and follow-up appointments. Patient seems reasonably able to adhere to treatment plan.      Medication Considerations:  Discussed medication options and treatment plan of prescribed medication(s) as well as the risks, benefits, and potential side effects. Patient is agreeable to call the office with any worsening of symptoms or onset of side effects. Patient is agreeable to call 911 or go to the nearest ER should he/she begin having SI/HI.    Therapy Plan:     Need for therapy based on depression, situational anxiety, social anxiety    Description of Therapy:   60 minutes spent engaged in supportive psychotherapy with the patient. Mental health diagnoses were addressed in therapy. Utilized supportive approach to engage patient in developing a safe space for patient to process presenting concerns. Clarified presenting problem; employed motivational interviewing techniques to gauge change readiness and devise treatment goals and objectives. Engaged client through insight-oriented approach to create further understanding of client's patterns. Utilized empathy and positive regard to encourage continued development of therapeutic relationship.     Session Started: 3:45 PM  Session Ended: 4:45 PM    Topics Discussed:   We discussed the patient's current stressors including school and interpersonal relationships.  We discussed his anxiety over recent exam scores.  We discussed the patient's issues with self-esteem and social anxiety.  We  discussed his propensity to assign his self-worth to external circumstances and achievements.  We discussed how this has left him feeling empty and unacceptable.  We discussed his interpersonal relationships and his difficulties therein.  We discussed being more vulnerable in his interpersonal relationships as a way to improve interpersonal connection.    Quality Measures:   Never smoker    I advised Justice of the risks of tobacco use.     Follow Up:   Return in about 1 week (around 4/7/2022) for Therapy Follow Up.      NONA Morris, PMHNP-BC

## 2022-05-02 ENCOUNTER — TELEMEDICINE (OUTPATIENT)
Dept: BEHAVIORAL HEALTH | Facility: CLINIC | Age: 22
End: 2022-05-02

## 2022-05-02 DIAGNOSIS — F41.1 GENERALIZED ANXIETY DISORDER: ICD-10-CM

## 2022-05-02 DIAGNOSIS — F40.10 SOCIAL ANXIETY DISORDER: ICD-10-CM

## 2022-05-02 DIAGNOSIS — F41.8 SITUATIONAL ANXIETY: ICD-10-CM

## 2022-05-02 DIAGNOSIS — F33.0 MILD EPISODE OF RECURRENT MAJOR DEPRESSIVE DISORDER: Primary | ICD-10-CM

## 2022-05-02 DIAGNOSIS — F33.0 MILD EPISODE OF RECURRENT MAJOR DEPRESSIVE DISORDER: ICD-10-CM

## 2022-05-02 PROCEDURE — 90838 PSYTX W PT W E/M 60 MIN: CPT

## 2022-05-02 PROCEDURE — 99214 OFFICE O/P EST MOD 30 MIN: CPT

## 2022-05-02 RX ORDER — ESCITALOPRAM OXALATE 10 MG/1
10 TABLET ORAL DAILY
Qty: 30 TABLET | Refills: 2 | Status: SHIPPED | OUTPATIENT
Start: 2022-05-02 | End: 2022-08-11

## 2022-05-02 NOTE — TELEPHONE ENCOUNTER
Last Office Visit: 3/31/22  Next Office Visit: 5/2/22        Last Refill Date:1/12/22  Quantity: 30 tabs  Refills: 2    Pharmacy: [facility] #[id number] - [facility] [address] [place] [address] AT [facility] [address] - [phone] [facility] [phone] FX

## 2022-05-03 NOTE — PROGRESS NOTES
Office  Follow Up Visit      Patient Name: Justice Pierce  : 2000   MRN: [id number]     Referring Provider: Julio Cesar Garcia APRN    Chief Complaint: Follow-up and psychotherapy related to:    ICD-10-CM ICD-9-CM   1. Mild episode of recurrent major depressive disorder (HCC)  F33.0 296.31   2. Social anxiety disorder  F40.10 300.23   3. Situational anxiety  F41.8 300.09      This provider is located at [facility], using a secure [employer] Video Visit through [employer]. Justice Pierce is being seen remotely via telehealth at their home address in [place], and stated they are in a secure environment for this session. The patient's condition being diagnosed/treated is appropriate for telemedicine. I NONA Mcneill identified myself as well as my credentials.   The patient, and/or patients guardian, consent to be seen remotely, and when consent is given they understand that the consent allows for patient identifiable information to be sent to a third party as needed.   They may refuse to be seen remotely at any time. The electronic data is encrypted and password protected, and the patient and/or guardian has been advised of the potential risks to privacy not withstanding such measures.     You have chosen to receive care through a telehealth visit.  Do you consent to use a video/audio connection for your medical care today?  Yes  This visit complies with patient safety concerns in accordance with CDC recommendations.    History of Present Illness: Justice Pierce is a 21 y.o. male who I spoke with on video visit today for follow-up and psychotherapy depressive and anxiety symptoms.  The patient reports that over the past month he has still been struggling with symptoms of depression and anxiety.  After talking, many of the symptoms seem to be largely situational related to stress at school.  The patient is currently taking his final exams, which are going well, which  has him feeling much better.  He does report some side effects with Lexapro.  He is having dreams which are difficult to distinguish from reality.  He also reports that he has been having night sweats which are occurring 1-2 times weekly which are severe.  He reports the side effects seem to be worse during times of stress.  He also reports a highly sporadic sleep schedule which is related to high courseload and homework burden.    Subjective      Review of Systems:   Review of Systems   Psychiatric/Behavioral: Positive for sleep disturbance and stress. Negative for self-injury, suicidal ideas and depressed mood. The patient is not nervous/anxious.        PHQ-9 Depression Screening Score:       Patient History:   The following portions of the patient's history were reviewed and updated as appropriate: allergies, current medications, past family history, past medical history, past social history, past surgical history and problem list.     Social:   Currently taking final exams, will work as  over the summer.  Is excited about the amount of money he is to make this summer.    Medications:     Current Outpatient Medications:   •  cetirizine (zyrTEC) 10 MG tablet, Take 1 tablet by mouth Daily., Disp: 30 tablet, Rfl: 5  •  escitalopram (LEXAPRO) 10 MG tablet, TAKE 1 TABLET BY MOUTH DAILY, Disp: 30 tablet, Rfl: 2  •  fluticasone (FLONASE) 50 MCG/ACT nasal spray, INSTILL 2 SPRAYS INTO THE NOSTRILS AS DIRECTED BY PROVIDER DAILY, Disp: 16 g, Rfl: 2  •  propranolol (INDERAL) 10 MG tablet, Take 1-2 tablets by mouth 3 (Three) Times a Day As Needed (for situational anxiety)., Disp: 60 tablet, Rfl: 1    Objective     Physical Exam:  Vital Signs: There were no vitals filed for this visit.  There is no height or weight on file to calculate BMI.     Mental Status Exam:   Appearance: Well-nourished young adult male, appears stated age, dressed appropriately to situation and weather  Hygiene: Good  Cooperation:  "Good  Eye Contact: Within normal limits  Psychomotor Behavior: Within normal limits  Mood: \"Feel great right now\"  Affect: Congruent, pleasant, full range of affect  Speech: Normal rate, tone and prosody  Thought Process: Linear, goal directed  Thought Content: Within normal limits  Suicidal: Denies  Homicidal: Denies  Hallucinations: Denies  Delusion: Denies  Memory: Intact  Orientation: X4  Reliability: Good  Insight: Good  Judgement: Good  Impulse Control: No apparent reported issues    Assessment / Plan      Visit Diagnosis/Orders Placed This Visit:  Diagnoses and all orders for this visit:    1. Mild episode of recurrent major depressive disorder (HCC) (Primary)    2. Social anxiety disorder    3. Situational anxiety         Plan:   1. Continue escitalopram 10 mg daily   2. Continue propranolol 10 mg up to 3 times daily as needed    We discussed current side effects and options.  Patient would like to stay on escitalopram to see if side effects persist during summer when his stress is lower.  We will reevaluate about mid summer.    Continue supportive psychotherapy efforts and medications as indicated. Treatment and medication options discussed during today's visit. Patient ackowledged and verbally consented to continue with current treatment plan and was educated on the importance of compliance with treatment and follow-up appointments. Patient seems reasonably able to adhere to treatment plan.      Medication Considerations:  Discussed medication options and treatment plan of prescribed medication(s) as well as the risks, benefits, and potential side effects. Patient is agreeable to call the office with any worsening of symptoms or onset of side effects. Patient is agreeable to call 911 or go to the nearest ER should he/she begin having SI/HI.    Therapy Plan:     Need for therapy based on depressive symptoms and anxiety    Description of Therapy:   55 minutes spent engaged in supportive psychotherapy with " "the patient. Mental health diagnoses were addressed in therapy. Utilized supportive approach to engage patient in developing a safe space for patient to process presenting concerns. Clarified presenting problem; employed motivational interviewing techniques to gauge change readiness and devise treatment goals and objectives. Engaged client through insight-oriented approach to create further understanding of client's patterns. Utilized empathy and positive regard to encourage continued development of therapeutic relationship.     Session Started: 4:05 PM  Session Ended: 5 PM    Topics Discussed:   We discussed his current stressors including school and finals.  We discussed how his mood and anxiety symptoms seem to be highly correlated to academic performance and school related stress.  We discussed building resistance and strategies over the summer to combat school related stress.  He discussed how he is still not doing well in physics however this seems to bother him less.  We spoke about his hobbies outside of school including recent hikes he has been on which he thoroughly enjoyed.  He reports he has been \"branching out\" more and doing more activities outside of school which he believes has been helpful.    Quality Measures:   Never smoker    I advised Justice of the risks of tobacco use.     Follow Up:   Return in about 1 week (around 5/9/2022).      NONA Morris, PMHNP-BC   "

## 2022-06-22 ENCOUNTER — OFFICE VISIT (OUTPATIENT)
Dept: INTERNAL MEDICINE | Facility: CLINIC | Age: 22
End: 2022-06-22

## 2022-06-22 VITALS
SYSTOLIC BLOOD PRESSURE: 116 MMHG | HEART RATE: 73 BPM | DIASTOLIC BLOOD PRESSURE: 70 MMHG | OXYGEN SATURATION: 98 % | TEMPERATURE: 97 F

## 2022-06-22 DIAGNOSIS — R05.9 COUGH: Primary | ICD-10-CM

## 2022-06-22 DIAGNOSIS — J02.9 SORE THROAT: ICD-10-CM

## 2022-06-22 LAB
EXPIRATION DATE: NORMAL
EXPIRATION DATE: NORMAL
INTERNAL CONTROL: NORMAL
INTERNAL CONTROL: NORMAL
Lab: NORMAL
Lab: NORMAL
S PYO AG THROAT QL: NEGATIVE
SARS-COV-2 AG UPPER RESP QL IA.RAPID: NOT DETECTED

## 2022-06-22 PROCEDURE — 99213 OFFICE O/P EST LOW 20 MIN: CPT | Performed by: INTERNAL MEDICINE

## 2022-06-22 PROCEDURE — 87880 STREP A ASSAY W/OPTIC: CPT | Performed by: INTERNAL MEDICINE

## 2022-06-22 PROCEDURE — 87426 SARSCOV CORONAVIRUS AG IA: CPT | Performed by: INTERNAL MEDICINE

## 2022-06-22 RX ORDER — AMOXICILLIN AND CLAVULANATE POTASSIUM 875; 125 MG/1; MG/1
1 TABLET, FILM COATED ORAL 2 TIMES DAILY
Qty: 20 TABLET | Refills: 0 | Status: SHIPPED | OUTPATIENT
Start: 2022-06-22

## 2022-06-22 RX ORDER — FLUTICASONE PROPIONATE 50 MCG
2 SPRAY, SUSPENSION (ML) NASAL DAILY
COMMUNITY
Start: 2022-05-31

## 2022-06-22 NOTE — PROGRESS NOTES
Subjective   Justice Pierce is a 21 y.o. male.   Chief Complaint   Patient presents with   • Cough   • Nasal Congestion   • Fatigue   • Sore Throat     Had negative covid test two weeks ago       History of Present Illness   Sore throat, cough congestion x 3 weeks. Negative covid 2 weeks ago. Throat getting worst. Tmax 99. No HA. No nausea or vomiting. OTC not helping.   The following portions of the patient's history were reviewed and updated as appropriate: allergies, current medications, past family history, past medical history, past social history, past surgical history and problem list.  Past Medical History:   Diagnosis Date   • Allergic      History reviewed. No pertinent surgical history.  Family History   Problem Relation Age of Onset   • Liver disease Mother    • Cancer Mother    • Migraines Father    • Migraines Sister      Social History     Socioeconomic History   • Marital status: Single   Tobacco Use   • Smoking status: Never Smoker   • Smokeless tobacco: Never Used   Substance and Sexual Activity   • Alcohol use: Never   • Drug use: Never   • Sexual activity: Not Currently     Current Outpatient Medications on File Prior to Visit   Medication Sig Dispense Refill   • cetirizine (zyrTEC) 10 MG tablet Take 1 tablet by mouth Daily. 30 tablet 5   • escitalopram (LEXAPRO) 10 MG tablet TAKE 1 TABLET BY MOUTH DAILY 30 tablet 2   • fluticasone (FLONASE) 50 MCG/ACT nasal spray 2 sprays into the nostril(s) as directed by provider Daily.     • propranolol (INDERAL) 10 MG tablet Take 1-2 tablets by mouth 3 (Three) Times a Day As Needed (for situational anxiety). 60 tablet 1   • [DISCONTINUED] fluticasone (FLONASE) 50 MCG/ACT nasal spray INSTILL 2 SPRAYS INTO THE NOSTRILS AS DIRECTED BY PROVIDER DAILY 16 g 2     No current facility-administered medications on file prior to visit.       Review of Systems   Constitutional: Positive for fever.   HENT: Positive for congestion and sore throat.    Respiratory: Positive  for cough.    Gastrointestinal: Negative for diarrhea, nausea and vomiting.   Musculoskeletal: Negative for arthralgias.   Neurological: Negative for dizziness and headaches.     /70   Pulse 73   Temp 97 °F (36.1 °C)   SpO2 98%     Objective   Physical Exam  Vitals reviewed.   HENT:      Right Ear: Tympanic membrane normal.      Left Ear: Tympanic membrane normal.      Mouth/Throat:      Pharynx: Oropharyngeal exudate (left) and posterior oropharyngeal erythema present.   Cardiovascular:      Rate and Rhythm: Normal rate and regular rhythm.      Heart sounds: No murmur heard.  Pulmonary:      Effort: No respiratory distress.      Breath sounds: No wheezing or rales.   Neurological:      Mental Status: He is alert.         Assessment & Plan   Diagnoses and all orders for this visit:    1. Cough (Primary)  -     Cancel: POCT SARS-CoV-2 Antigen ANNA + Flu  -     POCT SARS-CoV-2 Antigen ANNA  Covid negative  2. Sore throat  -     POCT rapid strep A  -     amoxicillin-clavulanate (Augmentin) 875-125 MG per tablet; Take 1 tablet by mouth 2 (Two) Times a Day.  Dispense: 20 tablet; Refill: 0  Strep negative but based on clinical exam prescribed augmentin

## 2022-06-29 DIAGNOSIS — J30.9 ALLERGIC RHINITIS, UNSPECIFIED SEASONALITY, UNSPECIFIED TRIGGER: ICD-10-CM

## 2022-06-29 RX ORDER — CETIRIZINE HYDROCHLORIDE 10 MG/1
TABLET ORAL
Qty: 30 TABLET | Refills: 5 | Status: SHIPPED | OUTPATIENT
Start: 2022-06-29

## 2022-07-21 ENCOUNTER — TELEMEDICINE (OUTPATIENT)
Dept: BEHAVIORAL HEALTH | Facility: CLINIC | Age: 22
End: 2022-07-21

## 2022-07-21 DIAGNOSIS — F41.1 GENERALIZED ANXIETY DISORDER: ICD-10-CM

## 2022-07-21 DIAGNOSIS — F33.0 MILD EPISODE OF RECURRENT MAJOR DEPRESSIVE DISORDER: Primary | ICD-10-CM

## 2022-07-21 DIAGNOSIS — F40.10 SOCIAL ANXIETY DISORDER: ICD-10-CM

## 2022-07-21 PROCEDURE — 99214 OFFICE O/P EST MOD 30 MIN: CPT

## 2022-07-21 PROCEDURE — 90836 PSYTX W PT W E/M 45 MIN: CPT

## 2022-07-25 NOTE — PROGRESS NOTES
Office  Follow Up & Psychotherapy Visit      Patient Name: Justice Pierce  : 2000   MRN: [id number]     Referring Provider: Julio Cesar Garcia APRN    Chief Complaint:      ICD-10-CM ICD-9-CM   1. Mild episode of recurrent major depressive disorder (HCC)  F33.0 296.31   2. Generalized anxiety disorder  F41.1 300.02   3. Social anxiety disorder  F40.10 300.23        This provider is located at [facility], using a secure [employer] Video Visit through [employer]. Justice Pierce is being seen remotely via telehealth at their home address in [place], and stated they are in a secure environment for this session. The patient's condition being diagnosed/treated is appropriate for telemedicine. I NONA Mcneill identified myself as well as my credentials.   The patient, and/or patients guardian, consent to be seen remotely, and when consent is given they understand that the consent allows for patient identifiable information to be sent to a third party as needed.   They may refuse to be seen remotely at any time. The electronic data is encrypted and password protected, and the patient and/or guardian has been advised of the potential risks to privacy not withstanding such measures.     You have chosen to receive care through a telehealth visit.  Do you consent to use a video/audio connection for your medical care today?  Yes  This visit complies with patient safety concerns in accordance with CDC recommendations.    History of Present Illness: Justice Pierce is a 21 y.o. male who I spoke with on video visit today for depression and anxiety.  Patient is currently off school, which is led to a decrease in stress.  He is working at least 50 hours weekly.  He has noticed an increase in anxiety over the past few weeks at school is set to begin.  Depression and anxiety remain stable from last visit.      Subjective      Review of Systems:   Review of Systems    Psychiatric/Behavioral: Positive for depressed mood and stress. Negative for self-injury and suicidal ideas. The patient is nervous/anxious.        PHQ-9 Depression Screening Score:       Patient History:   The following portions of the patient's history were reviewed and updated as appropriate: allergies, current medications, past family history, past medical history, past social history, past surgical history and problem list.     Social:   Currently working over the summer, working 40+ hours weekly.    Medications:     Current Outpatient Medications:   •  amoxicillin-clavulanate (Augmentin) 875-125 MG per tablet, Take 1 tablet by mouth 2 (Two) Times a Day., Disp: 20 tablet, Rfl: 0  •  cetirizine (zyrTEC) 10 MG tablet, TAKE 1 TABLET BY MOUTH EVERY DAY, Disp: 30 tablet, Rfl: 5  •  escitalopram (LEXAPRO) 10 MG tablet, TAKE 1 TABLET BY MOUTH DAILY, Disp: 30 tablet, Rfl: 2  •  fluticasone (FLONASE) 50 MCG/ACT nasal spray, 2 sprays into the nostril(s) as directed by provider Daily., Disp: , Rfl:   •  propranolol (INDERAL) 10 MG tablet, Take 1-2 tablets by mouth 3 (Three) Times a Day As Needed (for situational anxiety)., Disp: 60 tablet, Rfl: 1    Objective     Physical Exam:  Vital Signs: There were no vitals filed for this visit.  There is no height or weight on file to calculate BMI.       Mental Status Exam:   Appearance: This is AN overweight young adult  female, appears stated age, dressed appropriately to situation and weather  Hygiene: Good  Cooperation: Good  Eye Contact: Within normal limits  Psychomotor Behavior: Within normal limits  Mood: Okay  Affect: Congruent, full range  Speech: Normal rate, tone and prosody  Thought Process: Linear, goal directed  Thought Content: Mood congruent  Suicidal: Denies  Homicidal: Denies  Hallucinations: Denies  Delusion: Denies  Memory: Intact  Orientation: X4  Reliability: Good  Insight: Good  Judgement: Good  Impulse Control: No current concerns    Assessment /  Plan      Visit Diagnosis/Orders Placed This Visit:  Diagnoses and all orders for this visit:    1. Mild episode of recurrent major depressive disorder (HCC) (Primary)    2. Generalized anxiety disorder    3. Social anxiety disorder         Plan:   1. Continue escitalopram 10 mg daily  2. Continue propranolol as needed  3. Continue psychotherapy    Continue supportive psychotherapy efforts and medications as indicated. Treatment and medication options discussed during today's visit. Patient ackowledged and verbally consented to continue with current treatment plan and was educated on the importance of compliance with treatment and follow-up appointments. Patient seems reasonably able to adhere to treatment plan.      Medication Considerations:  Discussed medication options and treatment plan of prescribed medication(s) as well as the risks, benefits, and potential side effects. Patient is agreeable to call the office with any worsening of symptoms or onset of side effects. Patient is agreeable to call 911 or go to the nearest ER should he/she begin having SI/HI.    Therapy Plan:     Need for therapy based on depression anxiety and social anxiety    Description of Therapy:   45 minutes spent engaged in supportive psychotherapy with the patient. Mental health diagnoses were addressed in therapy. Utilized supportive approach to engage patient in developing a safe space for patient to process presenting concerns. Clarified presenting problem; employed motivational interviewing techniques to gauge change readiness and devise treatment goals and objectives. Engaged client through insight-oriented approach to create further understanding of client's patterns. Utilized empathy and positive regard to encourage continued development of therapeutic relationship.     Session Started: 3:45 PM  Session Ended: 4:30 PM    Items Discussed:   We discussed current stressors contributing to depression and anxiety including work and the  upcoming school year.  We discussed patient's ongoing struggles with depression and anxiety and underlying processes of each.    Progress:   Good    Quality Measures:    Tobacco Use/Cessation  Never smoker    I advised Justice of the risks of tobacco use.     Follow Up:   No follow-ups on file.      NONA Morris, PMHNP-BC

## 2022-08-11 DIAGNOSIS — F33.0 MILD EPISODE OF RECURRENT MAJOR DEPRESSIVE DISORDER: ICD-10-CM

## 2022-08-11 DIAGNOSIS — F41.1 GENERALIZED ANXIETY DISORDER: ICD-10-CM

## 2022-08-11 RX ORDER — ESCITALOPRAM OXALATE 10 MG/1
10 TABLET ORAL DAILY
Qty: 30 TABLET | Refills: 2 | Status: SHIPPED | OUTPATIENT
Start: 2022-08-11 | End: 2022-10-31 | Stop reason: DRUGHIGH

## 2022-10-31 ENCOUNTER — TELEMEDICINE (OUTPATIENT)
Dept: BEHAVIORAL HEALTH | Facility: CLINIC | Age: 22
End: 2022-10-31

## 2022-10-31 DIAGNOSIS — F33.0 MILD EPISODE OF RECURRENT MAJOR DEPRESSIVE DISORDER: Primary | ICD-10-CM

## 2022-10-31 DIAGNOSIS — F40.10 SOCIAL ANXIETY DISORDER: ICD-10-CM

## 2022-10-31 DIAGNOSIS — F41.8 SITUATIONAL ANXIETY: ICD-10-CM

## 2022-10-31 PROCEDURE — 99214 OFFICE O/P EST MOD 30 MIN: CPT

## 2022-10-31 PROCEDURE — 90838 PSYTX W PT W E/M 60 MIN: CPT

## 2022-10-31 RX ORDER — ESCITALOPRAM OXALATE 20 MG/1
20 TABLET ORAL DAILY
Qty: 30 TABLET | Refills: 2 | Status: SHIPPED | OUTPATIENT
Start: 2022-10-31 | End: 2022-12-29

## 2022-11-01 NOTE — PROGRESS NOTES
"     Office  Follow Up & Psychotherapy Visit      Patient Name: Justice Pierce  : 2000   MRN: [id number]     Referring Provider: Julio Cesar Garcia APRN    Chief Complaint:      ICD-10-CM ICD-9-CM   1. Mild episode of recurrent major depressive disorder (HCC)  F33.0 296.31   2. Social anxiety disorder  F40.10 300.23   3. Situational anxiety  F41.8 300.09   This provider is located at [facility], using a secure [employer] Video Visit through [employer]. Justice Pierce is being seen remotely via telehealth at their home address in [place], and stated they are in a secure environment for this session. The patient's condition being diagnosed/treated is appropriate for telemedicine. I NONA Mcneill identified myself as well as my credentials.   The patient, and/or patients guardian, consent to be seen remotely, and when consent is given they understand that the consent allows for patient identifiable information to be sent to a third party as needed.   They may refuse to be seen remotely at any time. The electronic data is encrypted and password protected, and the patient and/or guardian has been advised of the potential risks to privacy not withstanding such measures.     You have chosen to receive care through a telehealth visit.  Do you consent to use a video/audio connection for your medical care today?  Yes  This visit complies with patient safety concerns in accordance with CDC recommendations.    History of Present Illness: Justice Pierce is a 21 y.o. male who I spoke with on video visit today for follow up and psychotherapy related to depression and social anxiety.  Patient reports since beginning this semester at school, he has been experiencing a lack of motivation, low mood and low energy.  Patient believes he is \"burnout\" on school.  Patient is also still experiencing test/performance anxiety, which is impacting his ability to successfully complete " exams.      Subjective      Review of Systems:   Review of Systems   Psychiatric/Behavioral: Positive for depressed mood and stress. Negative for self-injury and suicidal ideas. The patient is nervous/anxious.        PHQ-9 Depression Screening Score:       Patient History:   The following portions of the patient's history were reviewed and updated as appropriate: allergies, current medications, past family history, past medical history, past social history, past surgical history and problem list.     Social:   Just started fall semester of his [employer] year.  Reports significant stress with school.    Medications:     Current Outpatient Medications:   •  amoxicillin-clavulanate (Augmentin) 875-125 MG per tablet, Take 1 tablet by mouth 2 (Two) Times a Day., Disp: 20 tablet, Rfl: 0  •  cetirizine (zyrTEC) 10 MG tablet, TAKE 1 TABLET BY MOUTH EVERY DAY, Disp: 30 tablet, Rfl: 5  •  escitalopram (Lexapro) 20 MG tablet, Take 1 tablet by mouth Daily., Disp: 30 tablet, Rfl: 2  •  fluticasone (FLONASE) 50 MCG/ACT nasal spray, 2 sprays into the nostril(s) as directed by provider Daily., Disp: , Rfl:   •  propranolol (INDERAL) 10 MG tablet, Take 1-2 tablets by mouth 3 (Three) Times a Day As Needed (for situational anxiety)., Disp: 60 tablet, Rfl: 1    Objective     Physical Exam:  Vital Signs: There were no vitals filed for this visit.  There is no height or weight on file to calculate BMI.       Mental Status Exam:   Appearance: This is an overweight young adult  male, appears stated age, dressed appropriately to situation and weather  Hygiene: Good, well-kept  Cooperation: Calm, cooperative  Eye Contact: Within normal limits  Psychomotor Behavior: Within normal limits  Mood: Down  Affect: Congruent, restricted  Speech: Normal rate, tone and prosody  Thought Process: Linear, goal directed  Thought Content: Mood congruent  Suicidal: Denies  Homicidal: Denied  Hallucinations: Denies  Delusion: Denies  Memory:  Intact  Orientation: X4  Reliability: Good  Insight: Fair  Judgement: Good  Impulse Control: Good    Assessment / Plan      Visit Diagnosis/Orders Placed This Visit:  Diagnoses and all orders for this visit:    1. Mild episode of recurrent major depressive disorder (HCC) (Primary)  -     escitalopram (Lexapro) 20 MG tablet; Take 1 tablet by mouth Daily.  Dispense: 30 tablet; Refill: 2    2. Social anxiety disorder  -     escitalopram (Lexapro) 20 MG tablet; Take 1 tablet by mouth Daily.  Dispense: 30 tablet; Refill: 2    3. Situational anxiety         Plan:   1. Increase escitalopram to 20 mg daily  2. Continue propranolol as needed    Continue supportive psychotherapy efforts and medications as indicated. Treatment and medication options discussed during today's visit. Patient ackowledged and verbally consented to continue with current treatment plan and was educated on the importance of compliance with treatment and follow-up appointments. Patient seems reasonably able to adhere to treatment plan.      Medication Considerations:  Discussed medication options and treatment plan of prescribed medication(s) as well as the risks, benefits, and potential side effects. Patient is agreeable to call the office with any worsening of symptoms or onset of side effects. Patient is agreeable to call 911 or go to the nearest ER should he/she begin having SI/HI.    Therapy Plan:     Need for therapy based on depression, social anxiety, situational anxiety    Description of Therapy:   55 minutes spent engaged in supportive psychotherapy with the patient. Mental health diagnoses were addressed in therapy. Utilized supportive approach to engage patient in developing a safe space for patient to process presenting concerns. Clarified presenting problem; employed motivational interviewing techniques to gauge change readiness and devise treatment goals and objectives. Engaged client through insight-oriented approach to create further  understanding of client's patterns. Utilized empathy and positive regard to encourage continued development of therapeutic relationship.     Session Started: 4:05 PM  Session Ended: 5 PM    Items Discussed:   We discussed current depressive symptoms as well as potential causes for these symptoms including stress at school.  We discussed situational anxiety around test taking and strategies for coping.    Progress:   Fair    Quality Measures:    Tobacco Use/Cessation  Never smoker    I advised Justice of the risks of tobacco use.     Follow Up:   Return in about 2 weeks (around 11/14/2022) for Therapy Follow Up.      NONA Morris, PMHNP-BC

## 2022-11-21 ENCOUNTER — TELEMEDICINE (OUTPATIENT)
Dept: BEHAVIORAL HEALTH | Facility: CLINIC | Age: 22
End: 2022-11-21

## 2022-11-21 DIAGNOSIS — F33.0 MILD EPISODE OF RECURRENT MAJOR DEPRESSIVE DISORDER: Primary | ICD-10-CM

## 2022-11-21 DIAGNOSIS — F41.3 OTHER MIXED ANXIETY DISORDERS: ICD-10-CM

## 2022-11-21 DIAGNOSIS — F41.8 SITUATIONAL ANXIETY: ICD-10-CM

## 2022-11-21 PROCEDURE — 90833 PSYTX W PT W E/M 30 MIN: CPT

## 2022-11-21 PROCEDURE — 99214 OFFICE O/P EST MOD 30 MIN: CPT

## 2022-11-21 NOTE — PROGRESS NOTES
Office  Follow Up & Psychotherapy Visit      Patient Name: Justice Pierce  : 2000   MRN: [id number]     Referring Provider: Julio Cesar Garcia APRN    Chief Complaint:      ICD-10-CM ICD-9-CM   1. Mild episode of recurrent major depressive disorder (HCC)  F33.0 296.31   2. Other mixed anxiety disorders  F41.3 300.09   3. Situational anxiety  F41.8 300.09        This provider is located at [facility], using a secure [employer] Video Visit through [employer]. Justice Pierce is being seen remotely via telehealth at their home address in [place], and stated they are in a secure environment for this session. The patient's condition being diagnosed/treated is appropriate for telemedicine. I NONA Mcneill identified myself as well as my credentials.   The patient, and/or patients guardian, consent to be seen remotely, and when consent is given they understand that the consent allows for patient identifiable information to be sent to a third party as needed.   They may refuse to be seen remotely at any time. The electronic data is encrypted and password protected, and the patient and/or guardian has been advised of the potential risks to privacy not withstanding such measures.     You have chosen to receive care through a telehealth visit.  Do you consent to use a video/audio connection for your medical care today?  Yes  This visit complies with patient safety concerns in accordance with CDC recommendations.    History of Present Illness: Justice Pierce is a 21 y.o. male who I spoke with on video visit today for follow-up related to depression and anxiety.  Patient reports since last visit he is still experiencing low mood and low self-esteem.  Patient attributes much of this to increased stressors at school.  Patient reports he has been struggling academically and on examinations.  Patient reports he has not noticed a difference in symptoms since increasing his  escitalopram    Rule out OCD:  Patient reports he has been triple checking problems on exams to ensure he is not making mistakes.  Patient reports if he does not do this he cannot stop thinking about it.  Patient also reports he has been spending an excess amount of time on homework and projects making sure lines are even incorrectly centered.  Patient reports if he does not do this he cannot get the thought out of his head until he does correct it.  Patient also reports he will have to rewind television shows to accurately remember line/jokes being told.  He also endorses fearing that he has missed something for his classes and having to compulsively check his campus account to ensure he has not.  Patient reports the symptoms have been present for some time, but have been worse with increased stress.      Subjective      Review of Systems:   Review of Systems   Psychiatric/Behavioral: Positive for depressed mood and stress. Negative for self-injury and suicidal ideas. The patient is nervous/anxious.        PHQ-9 Depression Screening Score:       Patient History:   The following portions of the patient's history were reviewed and updated as appropriate: allergies, current medications, past family history, past medical history, past social history, past surgical history and problem list.     Social:   Significant stress/struggle with school    Medications:     Current Outpatient Medications:   •  amoxicillin-clavulanate (Augmentin) 875-125 MG per tablet, Take 1 tablet by mouth 2 (Two) Times a Day., Disp: 20 tablet, Rfl: 0  •  cetirizine (zyrTEC) 10 MG tablet, TAKE 1 TABLET BY MOUTH EVERY DAY, Disp: 30 tablet, Rfl: 5  •  escitalopram (Lexapro) 20 MG tablet, Take 1 tablet by mouth Daily., Disp: 30 tablet, Rfl: 2  •  fluticasone (FLONASE) 50 MCG/ACT nasal spray, 2 sprays into the nostril(s) as directed by provider Daily., Disp: , Rfl:   •  propranolol (INDERAL) 10 MG tablet, Take 1-2 tablets by mouth 3 (Three) Times a  Day As Needed (for situational anxiety)., Disp: 60 tablet, Rfl: 1    Objective     Physical Exam:  Vital Signs: There were no vitals filed for this visit.  There is no height or weight on file to calculate BMI.       Mental Status Exam:   Appearance: This is an overweight young adult  male, appears stated age, dressed appropriately to situation and weather  Hygiene: Good, well-kept  Cooperation: Calm, cooperative  Eye Contact: Within normal  Psychomotor Behavior: Within normal limits  Mood: Down  Affect: Congruent, restricted  Speech: Normal rate, tone and prosody  Thought Process: Linear, goal directed  Thought Content: Some obsessional thinking  Suicidal: Denies  Homicidal: Denies  Hallucinations: Denies  Delusion: Denies  Memory: Intact  Orientation: X4  Reliability: Good  Insight: Fair  Judgement: Good  Impulse Control: Good    Assessment / Plan      Visit Diagnosis/Orders Placed This Visit:  Diagnoses and all orders for this visit:    1. Mild episode of recurrent major depressive disorder (HCC) (Primary)    2. Other mixed anxiety disorders    3. Situational anxiety         Plan:   1. Continue escitalopram 20 mg daily (plan to decrease/discontinue/switch at subsequent visits after the patient's final exams)  2. Continue propranolol as needed  3. Continue psychotherapy    Continue supportive psychotherapy efforts and medications as indicated. Treatment and medication options discussed during today's visit. Patient ackowledged and verbally consented to continue with current treatment plan and was educated on the importance of compliance with treatment and follow-up appointments. Patient seems reasonably able to adhere to treatment plan.      Medication Considerations:  Discussed medication options and treatment plan of prescribed medication(s) as well as the risks, benefits, and potential side effects. Patient is agreeable to call the office with any worsening of symptoms or onset of side effects. Patient  is agreeable to call 911 or go to the nearest ER should he/she begin having SI/HI.    Therapy Plan:     Need for therapy based on depression, anxiety    Description of Therapy:   25 minutes spent engaged in supportive psychotherapy with the patient. Mental health diagnoses were addressed in therapy. Utilized supportive approach to engage patient in developing a safe space for patient to process presenting concerns. Clarified presenting problem; employed motivational interviewing techniques to gauge change readiness and devise treatment goals and objectives. Engaged client through insight-oriented approach to create further understanding of client's patterns. Utilized empathy and positive regard to encourage continued development of therapeutic relationship.     Session Started: 1:40 PM  Session Ended: 2:05 PM    Items Discussed:   We discussed anxiety and current stressors including school.  We discussed triggers for anxiety and strategies for mitigating these.     Progress:   Fair    Quality Measures:    Tobacco Use/Cessation  Never smoker    I advised Justice of the risks of tobacco use.     Follow Up:   No follow-ups on file.      NONA Morris, PMHNP-BC

## 2022-11-28 ENCOUNTER — TELEMEDICINE (OUTPATIENT)
Dept: BEHAVIORAL HEALTH | Facility: CLINIC | Age: 22
End: 2022-11-28

## 2022-11-28 DIAGNOSIS — F40.10 SOCIAL ANXIETY DISORDER: ICD-10-CM

## 2022-11-28 DIAGNOSIS — F41.3 OTHER MIXED ANXIETY DISORDERS: ICD-10-CM

## 2022-11-28 DIAGNOSIS — F33.0 MILD EPISODE OF RECURRENT MAJOR DEPRESSIVE DISORDER: Primary | ICD-10-CM

## 2022-11-28 PROCEDURE — 90832 PSYTX W PT 30 MINUTES: CPT

## 2022-11-28 NOTE — PROGRESS NOTES
Office  Follow Up Psychotherapy Visit      Patient Name: Justice Pierce  : 2000   MRN: [id number]     Referring Provider: Julio Cesar Garcia APRN    Chief Complaint:      ICD-10-CM ICD-9-CM   1. Mild episode of recurrent major depressive disorder (HCC)  F33.0 296.31   2. Other mixed anxiety disorders  F41.3 300.09   3. Social anxiety disorder  F40.10 300.23        HPI/Interval History:   Justice Pierce is a 22 y.o. male who is here today for psychotherapy follow up related to depression and anxiety.      Subjective      Review of Systems:   Review of Systems   Psychiatric/Behavioral: Positive for sleep disturbance, depressed mood and stress. Negative for self-injury and suicidal ideas. The patient is nervous/anxious.        PHQ-9 Depression Screening Score:       Patient History:   The following portions of the patient's history were reviewed and updated as appropriate: allergies, current medications, past family history, past medical history, past social history, past surgical history and problem list.     Social:   Going into last 3 weeks of the semester.  Endorses significant stress with school    Medications:     Current Outpatient Medications:   •  amoxicillin-clavulanate (Augmentin) 875-125 MG per tablet, Take 1 tablet by mouth 2 (Two) Times a Day., Disp: 20 tablet, Rfl: 0  •  cetirizine (zyrTEC) 10 MG tablet, TAKE 1 TABLET BY MOUTH EVERY DAY, Disp: 30 tablet, Rfl: 5  •  escitalopram (Lexapro) 20 MG tablet, Take 1 tablet by mouth Daily., Disp: 30 tablet, Rfl: 2  •  fluticasone (FLONASE) 50 MCG/ACT nasal spray, 2 sprays into the nostril(s) as directed by provider Daily., Disp: , Rfl:   •  propranolol (INDERAL) 10 MG tablet, Take 1-2 tablets by mouth 3 (Three) Times a Day As Needed (for situational anxiety)., Disp: 60 tablet, Rfl: 1    Objective     Physical Exam:  Vital Signs: There were no vitals filed for this visit.  There is no height or weight on file to calculate BMI.     Mental Status Exam:    Appearance: This is an overweight young adult  male, appears stated age, dressed appropriately to situation and weather  Hygiene: Good  Cooperation: Calm, cooperative  Eye Contact: Within normal limits  Psychomotor Behavior: Within normal limits  Mood: Down  Affect: Congruent, restricted  Speech: Normal rate, tone and prosody  Thought Process: Linear, goal directed  Thought Content: Mood congruent  Suicidal: Denies  Homicidal: Denies  Hallucinations: Denies  Delusion: Denies  Memory: Intact  Orientation: X4  Reliability: Good  Insight: Good  Judgement: Good  Impulse Control: No current concerns    Assessment / Plan      Visit Diagnosis/Orders Placed This Visit:  Diagnoses and all orders for this visit:    1. Mild episode of recurrent major depressive disorder (HCC) (Primary)    2. Other mixed anxiety disorders    3. Social anxiety disorder         Plan:     Continue supportive/behavioral/dynamic psychotherapy efforts and medications as indicated. Treatment options discussed during today's visit. Patient ackowledged and verbally consented to continue with current treatment plan and was educated on the importance of compliance with treatment and follow-up appointments. Patient seems reasonably able to adhere to treatment plan.      Medication Considerations:  Discussed medication options and treatment plan of prescribed medication(s) as well as the risks, benefits, and potential side effects. Patient is agreeable to call the office with any worsening of symptoms or onset of side effects. Patient is agreeable to call 911 or go to the nearest ER should he/she begin having SI/HI.    Therapy Plan:     Need for therapy based on depression, anxiety    Description of Therapy:   34 minutes spent engaged in supportive psychotherapy with the patient. Mental health diagnoses were addressed in therapy. Utilized supportive approach to engage patient in developing a safe space for patient to process presenting concerns.  Clarified presenting problem; employed motivational interviewing techniques to gauge change readiness and devise treatment goals and objectives. Engaged client through insight-oriented approach to create further understanding of client's patterns. Utilized empathy and positive regard to encourage continued development of therapeutic relationship.     Session Started: 10:36 AM  Session Ended: 11:10 AM    Items Discussed:   We discussed current stressors including school.  We discussed anxiety including performance/test based and social.  We discussed executive functioning strategies including scheduling productive and rest times.  We discussed emotional numbing and when to tell the difference between numbing and resting.    Progress:   Good    Quality Measures:    Tobacco Use/Cessation  Never smoker    I advised Justice of the risks of tobacco use.     Follow Up:   No follow-ups on file.      NONA Morris, PMHNP-BC

## 2022-12-05 ENCOUNTER — TELEMEDICINE (OUTPATIENT)
Dept: BEHAVIORAL HEALTH | Facility: CLINIC | Age: 22
End: 2022-12-05

## 2022-12-05 DIAGNOSIS — F33.0 MILD EPISODE OF RECURRENT MAJOR DEPRESSIVE DISORDER: Primary | ICD-10-CM

## 2022-12-05 DIAGNOSIS — F41.3 OTHER MIXED ANXIETY DISORDERS: ICD-10-CM

## 2022-12-05 DIAGNOSIS — F40.10 SOCIAL ANXIETY DISORDER: ICD-10-CM

## 2022-12-05 DIAGNOSIS — F41.8 SITUATIONAL ANXIETY: ICD-10-CM

## 2022-12-05 PROCEDURE — 99214 OFFICE O/P EST MOD 30 MIN: CPT

## 2022-12-05 PROCEDURE — 90833 PSYTX W PT W E/M 30 MIN: CPT

## 2022-12-05 RX ORDER — PROPRANOLOL HYDROCHLORIDE 10 MG/1
TABLET ORAL
Qty: 60 TABLET | Refills: 1 | Status: SHIPPED | OUTPATIENT
Start: 2022-12-05

## 2022-12-05 NOTE — PROGRESS NOTES
Office  Follow Up & Psychotherapy Visit      Patient Name: Justice Pierce  : 2000   MRN: [id number]     Referring Provider: Julio Cesar Garcia APRN    Chief Complaint:      ICD-10-CM ICD-9-CM   1. Mild episode of recurrent major depressive disorder (HCC)  F33.0 296.31   2. Social anxiety disorder  F40.10 300.23   3. Other mixed anxiety disorders  F41.3 300.09        HPI/Interval History:   Justice Pierce is a 22 y.o. male who is here today for follow up and psychotherapy related to depression and anxiety.  Patient reports since last visit, he has been experiencing some school related stress and anxiety.  Is currently going through final project and exam weeks.  Patient does report he recently lost his bottle of propranolol.  Propranolol is working well, he is taking a few times per week.  He may increase to aloud to tablets, particularly for stressful events like exams.      Subjective      Review of Systems:   Review of Systems   Cardiovascular: Negative.    Psychiatric/Behavioral: Positive for stress. Negative for self-injury, sleep disturbance, suicidal ideas and depressed mood. The patient is nervous/anxious.        PHQ-9 Depression Screening Score:       Patient History:   The following portions of the patient's history were reviewed and updated as appropriate: allergies, current medications, past family history, past medical history, past social history, past surgical history and problem list.     Social:   Finishing final projects    Medications:     Current Outpatient Medications:   •  amoxicillin-clavulanate (Augmentin) 875-125 MG per tablet, Take 1 tablet by mouth 2 (Two) Times a Day., Disp: 20 tablet, Rfl: 0  •  cetirizine (zyrTEC) 10 MG tablet, TAKE 1 TABLET BY MOUTH EVERY DAY, Disp: 30 tablet, Rfl: 5  •  escitalopram (Lexapro) 20 MG tablet, Take 1 tablet by mouth Daily., Disp: 30 tablet, Rfl: 2  •  fluticasone (FLONASE) 50 MCG/ACT nasal spray, 2 sprays into the nostril(s) as directed by  provider Daily., Disp: , Rfl:   •  propranolol (INDERAL) 10 MG tablet, TAKE 1 TO 2 TABLETS BY MOUTH THREE TIMES DAILY AS NEEDED FOR SITUATIONAL ANXIETY, Disp: 60 tablet, Rfl: 1    Objective     Physical Exam:  Vital Signs: There were no vitals filed for this visit.  There is no height or weight on file to calculate BMI.     Mental Status Exam:     Appearance: This is a pleasant young adult  male, appears stated age, dressed appropriately to situation with  Hygiene: Good, well-kept  Cooperation: Calm, cooperative  Eye Contact: Within normal limits  Psychomotor Behavior: Within normal limits  Mood: Stressed  Affect: Congruent, full range  Speech: Normal rate, tone and prosody  Thought Process: Linear, goal directed  Thought Content: Mood congruent  Suicidal: Denies  Homicidal: Denies  Hallucinations: Denies  Delusion: Denies  Memory: Intact  Orientation: X4  Reliability: Good  Insight: Fair  Judgement: Good  Impulse Control: Good, no current concern      Assessment / Plan      Visit Diagnosis/Orders Placed This Visit:  Diagnoses and all orders for this visit:    1. Mild episode of recurrent major depressive disorder (HCC) (Primary)    2. Social anxiety disorder    3. Other mixed anxiety disorders         Plan:   1. Continue escitalopram 20 mg daily (may consider switching at subsequent visits, after final exams)  2. Continue propranolol 10 to 20 mg up to twice daily as needed    Continue supportive psychotherapy efforts and medications as indicated. Treatment and medication options discussed during today's visit. Patient ackowledged and verbally consented to continue with current treatment plan and was educated on the importance of compliance with treatment and follow-up appointments. Patient seems reasonably able to adhere to treatment plan.      Medication Considerations:  Discussed medication options and treatment plan of prescribed medication(s) as well as the risks, benefits, and potential side effects.  Patient is agreeable to call the office with any worsening of symptoms or onset of side effects. Patient is agreeable to call 911 or go to the nearest ER should he/she begin having SI/HI.    Therapy Plan:     Need for therapy based on anxiety, depression    Description of Therapy:   25 minutes spent engaged in supportive psychotherapy with the patient. Mental health diagnoses were addressed in therapy. Utilized supportive approach to engage patient in developing a safe space for patient to process presenting concerns. Clarified presenting problem; employed motivational interviewing techniques to gauge change readiness and devise treatment goals and objectives. Engaged client through insight-oriented approach to create further understanding of client's patterns. Utilized empathy and positive regard to encourage continued development of therapeutic relationship.     Session Started: 4:40 PM  Session Ended: 5:05 PM    Items Discussed:   We discussed current stressors including anxiety related to ongoing school assignments. we discussed potential strategies for conflict resolution with group member.    Progress:   Fair    Quality Measures:    Tobacco Use/Cessation  Never smoker    I advised Justice of the risks of tobacco use.     Follow Up:   Return in about 1 week (around 12/12/2022) for Therapy Follow Up.      NONA Morris, PMHNP-BC

## 2022-12-29 ENCOUNTER — TELEMEDICINE (OUTPATIENT)
Dept: BEHAVIORAL HEALTH | Facility: CLINIC | Age: 22
End: 2022-12-29

## 2022-12-29 ENCOUNTER — TELEPHONE (OUTPATIENT)
Dept: INTERNAL MEDICINE | Facility: CLINIC | Age: 22
End: 2022-12-29

## 2022-12-29 DIAGNOSIS — F41.3 OTHER MIXED ANXIETY DISORDERS: ICD-10-CM

## 2022-12-29 DIAGNOSIS — F33.0 MILD EPISODE OF RECURRENT MAJOR DEPRESSIVE DISORDER: Primary | ICD-10-CM

## 2022-12-29 PROCEDURE — 90833 PSYTX W PT W E/M 30 MIN: CPT

## 2022-12-29 PROCEDURE — 99214 OFFICE O/P EST MOD 30 MIN: CPT

## 2022-12-29 RX ORDER — FLUOXETINE HYDROCHLORIDE 20 MG/1
20 CAPSULE ORAL DAILY
Qty: 30 CAPSULE | Refills: 1 | Status: SHIPPED | OUTPATIENT
Start: 2022-12-29 | End: 2023-02-27 | Stop reason: DRUGHIGH

## 2022-12-29 NOTE — PROGRESS NOTES
Office  Follow Up & Psychotherapy Visit      Patient Name: Justice Pierce  : 2000   MRN: [id number]     Referring Provider: Julio Cesar Garcia APRN    Chief Complaint:      ICD-10-CM ICD-9-CM   1. Mild episode of recurrent major depressive disorder (HCC)  F33.0 296.31   2. Other mixed anxiety disorders  F41.3 300.09        HPI/Interval History:   Justice Pierce is a 22 y.o. male who is here today for follow up and psychotherapy related to depression, anxiety disorder: Patient reports he stopped taking his Lexapro on his own around 2 weeks ago.  Patient has not noticed any difference in mood or anxiety since discontinuing.  Patient is interested in starting another medication.  Depression and anxiety remain largely unchanged.    Subjective      Review of Systems:   Review of Systems   Constitutional: Negative.    Psychiatric/Behavioral: Negative for self-injury, suicidal ideas and depressed mood. The patient is nervous/anxious.        PHQ-9 Depression Screening Score:       Patient History:   The following portions of the patient's history were reviewed and updated as appropriate: allergies, current medications, past family history, past medical history, past social history, past surgical history and problem list.     Social:   Currently on winter break.  Weighing his next year's living situation options.    Medications:     Current Outpatient Medications:   •  amoxicillin-clavulanate (Augmentin) 875-125 MG per tablet, Take 1 tablet by mouth 2 (Two) Times a Day., Disp: 20 tablet, Rfl: 0  •  cetirizine (zyrTEC) 10 MG tablet, TAKE 1 TABLET BY MOUTH EVERY DAY, Disp: 30 tablet, Rfl: 5  •  FLUoxetine (PROzac) 20 MG capsule, Take 1 capsule by mouth Daily., Disp: 30 capsule, Rfl: 1  •  fluticasone (FLONASE) 50 MCG/ACT nasal spray, 2 sprays into the nostril(s) as directed by provider Daily., Disp: , Rfl:   •  propranolol (INDERAL) 10 MG tablet, TAKE 1 TO 2 TABLETS BY MOUTH THREE TIMES DAILY AS NEEDED FOR  "SITUATIONAL ANXIETY, Disp: 60 tablet, Rfl: 1    Objective     Physical Exam:  Vital Signs: There were no vitals filed for this visit.  There is no height or weight on file to calculate BMI.     Mental Status Exam:     Appearance: This is an adult  male, appears stated age, dressed appropriately to situation with  Hygiene: Good, well-kept  Cooperation: Calm, cooperative  Eye Contact: Within normal  Psychomotor Behavior: Normal limits  Mood: \"Okay\"  Affect: Congruent, full range  Speech: Normal rate, tone and prosody  Thought Process: Linear, goal directed  Thought Content: Mood congruent  Suicidal: Denies  Homicidal: Denies  Hallucinations: Denies  Delusion: Denies  Memory: Intact  Orientation: X4  Reliability: Good  Insight: Fair  Judgement: Good  Impulse Control: Good, no current concern      Assessment / Plan      Visit Diagnosis/Orders Placed This Visit:  Diagnoses and all orders for this visit:    1. Mild episode of recurrent major depressive disorder (HCC) (Primary)  -     FLUoxetine (PROzac) 20 MG capsule; Take 1 capsule by mouth Daily.  Dispense: 30 capsule; Refill: 1    2. Other mixed anxiety disorders  -     FLUoxetine (PROzac) 20 MG capsule; Take 1 capsule by mouth Daily.  Dispense: 30 capsule; Refill: 1         Plan:   1. Discontinue escitalopram (patient discontinued on his own)  2. Start fluoxetine 20 mg daily    Continue supportive psychotherapy efforts and medications as indicated. Treatment and medication options discussed during today's visit. Patient ackowledged and verbally consented to continue with current treatment plan and was educated on the importance of compliance with treatment and follow-up appointments. Patient seems reasonably able to adhere to treatment plan.      Medication Considerations:  Discussed medication options and treatment plan of prescribed medication(s) as well as the risks, benefits, and potential side effects. Patient is agreeable to call the office with any " worsening of symptoms or onset of side effects. Patient is agreeable to call 911 or go to the nearest ER should he/she begin having SI/HI.    Therapy Plan:     Need for therapy based on anxiety, depression    Description of Therapy:   20 minutes spent engaged in supportive psychotherapy with the patient. Mental health diagnoses were addressed in therapy. Utilized supportive approach to engage patient in developing a safe space for patient to process presenting concerns. Clarified presenting problem; employed motivational interviewing techniques to gauge change readiness and devise treatment goals and objectives. Engaged client through insight-oriented approach to create further understanding of client's patterns. Utilized empathy and positive regard to encourage continued development of therapeutic relationship.     Session Started: 2:55 PM  Session Ended: 315    Items Discussed:   We discussed current stressors including deciding where to live next year.  Discussed depressive symptoms and strategies for reducing propensity for increasing symptoms secondary to loneliness    Progress:   Good    Quality Measures:    Tobacco Use/Cessation  Never smoker    I advised Justice of the risks of tobacco use.     Follow Up:   No follow-ups on file.      NONA Morris, PMHNP-BC

## 2023-02-27 ENCOUNTER — TELEMEDICINE (OUTPATIENT)
Dept: BEHAVIORAL HEALTH | Facility: CLINIC | Age: 23
End: 2023-02-27
Payer: COMMERCIAL

## 2023-02-27 DIAGNOSIS — F33.0 MILD EPISODE OF RECURRENT MAJOR DEPRESSIVE DISORDER: Primary | ICD-10-CM

## 2023-02-27 DIAGNOSIS — F41.3 OTHER MIXED ANXIETY DISORDERS: ICD-10-CM

## 2023-02-27 PROCEDURE — 99214 OFFICE O/P EST MOD 30 MIN: CPT

## 2023-02-27 PROCEDURE — 90833 PSYTX W PT W E/M 30 MIN: CPT

## 2023-02-27 RX ORDER — FLUOXETINE HYDROCHLORIDE 40 MG/1
40 CAPSULE ORAL DAILY
Qty: 30 CAPSULE | Refills: 0 | Status: SHIPPED | OUTPATIENT
Start: 2023-02-27

## 2023-03-13 ENCOUNTER — TELEMEDICINE (OUTPATIENT)
Dept: BEHAVIORAL HEALTH | Facility: CLINIC | Age: 23
End: 2023-03-13
Payer: COMMERCIAL

## 2023-03-13 DIAGNOSIS — F41.3 OTHER MIXED ANXIETY DISORDERS: Primary | ICD-10-CM

## 2023-03-13 DIAGNOSIS — F33.0 MILD EPISODE OF RECURRENT MAJOR DEPRESSIVE DISORDER: ICD-10-CM

## 2023-03-13 DIAGNOSIS — F41.8 SITUATIONAL ANXIETY: ICD-10-CM

## 2023-03-13 PROCEDURE — 90838 PSYTX W PT W E/M 60 MIN: CPT

## 2023-03-13 PROCEDURE — 99214 OFFICE O/P EST MOD 30 MIN: CPT

## 2023-03-14 NOTE — PROGRESS NOTES
Office  Follow Up & Psychotherapy Visit      Patient Name: Justice Pierce  : 2000   MRN: [id number]     Referring Provider: Julio Cesar Garcia APRN    Chief Complaint:  Justice Pierce is a 22 y.o. male who I spoke with on video visit today for follow-up related to:      ICD-10-CM ICD-9-CM   1. Other mixed anxiety disorders  F41.3 300.09   2. Mild episode of recurrent major depressive disorder (HCC)  F33.0 296.31   3. Situational anxiety  F41.8 300.09        This provider is located at [facility], using a secure "[employer]hart Video Visit through [employer]. Justice Pierce is being seen remotely via telehealth at their home address in [place], and stated they are in a secure environment for this session. The patient's condition being diagnosed/treated is appropriate for telemedicine. I NONA Mcneill identified myself as well as my credentials.   The patient, and/or patients guardian, consent to be seen remotely, and when consent is given they understand that the consent allows for patient identifiable information to be sent to a third party as needed.   They may refuse to be seen remotely at any time. The electronic data is encrypted and password protected, and the patient and/or guardian has been advised of the potential risks to privacy not withstanding such measures.     You have chosen to receive care through a telehealth visit.  Do you consent to use a video/audio connection for your medical care today?  Yes  This visit complies with patient safety concerns in accordance with [facility] recommendations.    History of Present Illness:     Anxiety disorder:  Patient reports overall anxiety seems improved and more manageable.  He believes switch to fluoxetine has been somewhat helpful.    Situational anxiety:  Is still experiencing situational anxiety, particularly while taking exams.  Propranolol has been helpful in reducing symptoms.    Depression:  Mood has been improved  overall.  Patient reports mood has been euthymic and stable recently.  Denies suicidal ideations or thoughts of self-harm.  Denies side effects of medication.    Subjective      Review of Systems:   Review of Systems   Constitutional: Negative.    Cardiovascular: Negative.    Psychiatric/Behavioral: Positive for sleep disturbance and stress. Negative for self-injury, suicidal ideas and depressed mood. The patient is nervous/anxious.        PHQ-9 Depression Screening Score:       Patient History:   The following portions of the patient's history were reviewed and updated as appropriate: allergies, current medications, past family history, past medical history, past social history, past surgical history and problem list.     Social:   No significant update    Medications:     Current Outpatient Medications:   •  amoxicillin-clavulanate (Augmentin) 875-125 MG per tablet, Take 1 tablet by mouth 2 (Two) Times a Day., Disp: 20 tablet, Rfl: 0  •  cetirizine (zyrTEC) 10 MG tablet, TAKE 1 TABLET BY MOUTH EVERY DAY, Disp: 30 tablet, Rfl: 5  •  FLUoxetine (PROzac) 40 MG capsule, Take 1 capsule by mouth Daily., Disp: 30 capsule, Rfl: 0  •  fluticasone (FLONASE) 50 MCG/ACT nasal spray, 2 sprays into the nostril(s) as directed by provider Daily., Disp: , Rfl:   •  propranolol (INDERAL) 10 MG tablet, TAKE 1 TO 2 TABLETS BY MOUTH THREE TIMES DAILY AS NEEDED FOR SITUATIONAL ANXIETY, Disp: 60 tablet, Rfl: 1    Objective     Physical Exam:  Vital Signs: There were no vitals filed for this visit.  There is no height or weight on file to calculate BMI.     Mental Status Exam:     Appearance: This is a young adult  male, appears stated age, dressed appropriately to situation and weather  Hygiene: Good, well-kept  Cooperation: Calm, cooperative  Eye Contact: Within normal  Psychomotor Behavior: Normal limits  Mood: Euthymic  Affect: Congruent, full range  Speech: Normal rate, tone and prosody  Thought Process: Linear, goal  directed  Thought Content: Mood congruent  Suicidal: Denies  Homicidal: Denies  Hallucinations: Denies  Delusion: Denies  Memory: Intact  Orientation: X4  Reliability: Good  Insight: Fair  Judgement: Good  Impulse Control: Good, no current concern      Assessment / Plan      Visit Diagnosis/Orders Placed This Visit:  Diagnoses and all orders for this visit:    1. Other mixed anxiety disorders (Primary)    2. Mild episode of recurrent major depressive disorder (HCC)    3. Situational anxiety         Plan:   1. Continue fluoxetine 40 mg daily  2. Continue propranolol 10 to 20 mg up to twice daily as needed    Continue supportive psychotherapy efforts and medications as indicated. Treatment and medication options discussed during today's visit. Patient ackowledged and verbally consented to continue with current treatment plan and was educated on the importance of compliance with treatment and follow-up appointments. Patient seems reasonably able to adhere to treatment plan.      Medication Considerations:  Discussed medication options and treatment plan of prescribed medication(s) as well as the risks, benefits, and potential side effects. Patient is agreeable to call the office with any worsening of symptoms or onset of side effects. Patient is agreeable to call 911 or go to the nearest ER should he/she begin having SI/HI.    Therapy Plan:     Need for therapy based on anxiety, depression    Description of Therapy:   55 minutes spent engaged in supportive psychotherapy with the patient. Mental health diagnoses were addressed in therapy. Utilized supportive approach to engage patient in developing a safe space for patient to process presenting concerns. Clarified presenting problem; employed motivational interviewing techniques to gauge change readiness and devise treatment goals and objectives. Engaged client through insight-oriented approach to create further understanding of client's patterns. Utilized empathy and  positive regard to encourage continued development of therapeutic relationship.     Session Started: 2:20 PM  Session Ended: 3:15 PM    Items Discussed:   We discussed improvements in symptoms since beginning psychotherapy.  We discussed strategies for combating/reducing stress/feelings of overwhelm.  We discussed internment of care and potential goals for future psychotherapy.    Progress:   Good    Prognosis:  Good    Functional status:  Mild impairment    Quality Measures:    Tobacco Use/Cessation  Never smoker    I advised Justice of the risks of tobacco use.     Follow Up:   No follow-ups on file.    Transition of care:  I advised patient that I will be leaving the practice, effective March 23, 2023. I advised the patient that clinic staff will be reaching out to her soon to provide scheduling options for alternative psychiatric providers.  Advised patient to reach out to the clinic if he/she has not heard back from clinic staff by March 1 in regards to scheduling. Patient verbalizes understanding.      NONA Morris, PMHNP-BC

## 2023-03-22 NOTE — PROGRESS NOTES
Office  Follow Up & Psychotherapy Visit      Patient Name: Justice Pierce  : 2000   MRN: [id number]     Referring Provider: Julio Cesar Garcia APRN    This provider is located at [facility], using a secure [employer] Video Visit through [employer]. Justice Pierce is being seen remotely via telehealth at their home address in [place], and stated they are in a secure environment for this session. The patient's condition being diagnosed/treated is appropriate for telemedicine. I NONA Mcneill identified myself as well as my credentials.   The patient, and/or patients guardian, consent to be seen remotely, and when consent is given they understand that the consent allows for patient identifiable information to be sent to a third party as needed.   They may refuse to be seen remotely at any time. The electronic data is encrypted and password protected, and the patient and/or guardian has been advised of the potential risks to privacy not withstanding such measures.     You have chosen to receive care through a telehealth visit.  Do you consent to use a video/audio connection for your medical care today?  Yes  This visit complies with patient safety concerns in accordance with CDC recommendations.    Chief complaint: Justice Pierce is a 22 y.o. male who I spoke with on video visit today for follow-up and psychotherapy related to:      ICD-10-CM ICD-9-CM   1. Mild episode of recurrent major depressive disorder (HCC)  F33.0 296.31   2. Other mixed anxiety disorders  F41.3 300.09        HPI/Interval History:     Depression:  Patient reports some mild improvement in depressive symptoms.  Patient reports mood has been okay overall.  He does report vivid dreams have reduced since switching to Prozac.  He also reports he has been doing more with his friends outside of school, which has been beneficial to mood.  Denies suicidal ideation.    Anxiety disorder:  Largely unchanged.   "Continues to struggle with perfectionism and excessive worry.  Continues to experience some situational stressors with school and exams.      Subjective      Review of Systems:   Review of Systems   Constitutional: Negative.    Psychiatric/Behavioral: Positive for stress. Negative for self-injury, suicidal ideas and depressed mood. The patient is nervous/anxious.        PHQ-9 Depression Screening Score:       Patient History:   The following portions of the patient's history were reviewed and updated as appropriate: allergies, current medications, past family history, past medical history, past social history, past surgical history and problem list.     Social:   No significant update    Medications:     Current Outpatient Medications:   •  amoxicillin-clavulanate (Augmentin) 875-125 MG per tablet, Take 1 tablet by mouth 2 (Two) Times a Day., Disp: 20 tablet, Rfl: 0  •  cetirizine (zyrTEC) 10 MG tablet, TAKE 1 TABLET BY MOUTH EVERY DAY, Disp: 30 tablet, Rfl: 5  •  FLUoxetine (PROzac) 40 MG capsule, Take 1 capsule by mouth Daily., Disp: 30 capsule, Rfl: 0  •  fluticasone (FLONASE) 50 MCG/ACT nasal spray, 2 sprays into the nostril(s) as directed by provider Daily., Disp: , Rfl:   •  propranolol (INDERAL) 10 MG tablet, TAKE 1 TO 2 TABLETS BY MOUTH THREE TIMES DAILY AS NEEDED FOR SITUATIONAL ANXIETY, Disp: 60 tablet, Rfl: 1    Objective     Physical Exam:  Vital Signs: There were no vitals filed for this visit.  There is no height or weight on file to calculate BMI.     Mental Status Exam:     Appearance: Young adult  male, appears stated age, dressed appropriately to situation and weather  Hygiene: Good, well-kept  Cooperation: Calm, cooperative  Eye Contact: Within normal  Psychomotor Behavior: Normal limits  Mood: \"Okay\"  Affect: Congruent, restricted  Speech: Normal rate, tone and prosody  Thought Process: Linear, goal directed  Thought Content: Mood congruent  Suicidal: Denies  Homicidal: " Denies  Hallucinations: Denies  Delusion: Denies  Memory: Intact  Orientation: X4  Reliability: Good  Insight: Fair  Judgement: Good  Impulse Control: Good, no current concern      Assessment / Plan      Visit Diagnosis/Orders Placed This Visit:  Diagnoses and all orders for this visit:    1. Mild episode of recurrent major depressive disorder (HCC) (Primary)  -     FLUoxetine (PROzac) 40 MG capsule; Take 1 capsule by mouth Daily.  Dispense: 30 capsule; Refill: 0    2. Other mixed anxiety disorders  -     FLUoxetine (PROzac) 40 MG capsule; Take 1 capsule by mouth Daily.  Dispense: 30 capsule; Refill: 0         Plan:   1. Increase fluoxetine to 40 mg daily  2. Continue psychotherapy    Continue supportive psychotherapy efforts and medications as indicated. Treatment and medication options discussed during today's visit. Patient ackowledged and verbally consented to continue with current treatment plan and was educated on the importance of compliance with treatment and follow-up appointments. Patient seems reasonably able to adhere to treatment plan.      Medication Considerations:  Discussed medication options and treatment plan of prescribed medication(s) as well as the risks, benefits, and potential side effects. Patient is agreeable to call the office with any worsening of symptoms or onset of side effects. Patient is agreeable to call 911 or go to the nearest ER should he/she begin having SI/HI.    Therapy Plan:     Need for therapy based on anxiety, depression    Description of Therapy:   30 minutes spent engaged in supportive psychotherapy with the patient. Mental health diagnoses were addressed in therapy. Utilized supportive approach to engage patient in developing a safe space for patient to process presenting concerns. Clarified presenting problem; employed motivational interviewing techniques to gauge change readiness and devise treatment goals and objectives. Engaged client through insight-oriented  approach to create further understanding of client's patterns. Utilized empathy and positive regard to encourage continued development of therapeutic relationship.     Session Started: 9:50 AM  Session Ended: 10:20 AM    Items Discussed:   We discussed current stressors including school.  We discussed strategies for improving social engagement as well as benefits he he has seen with this.  We discussed career trajectory and upcoming decision about career field.    Progress:   Good    Prognosis:  Good    Functional status:  Mild impairment    Quality Measures:    Tobacco Use/Cessation  Never smoker    I advised Justice of the risks of tobacco use.     Follow Up:   No follow-ups on file.    Transition of care:  I advised patient that I will be leaving the practice, effective March 23, 2023. I advised the patient that clinic staff will be reaching out to her soon to provide scheduling options for alternative psychiatric providers.  Advised patient to reach out to the clinic if he/she has not heard back from clinic staff by March 1 in regards to scheduling. Patient verbalizes understanding.      NONA Morris, PMHNP-BC

## 2023-04-20 ENCOUNTER — TELEPHONE (OUTPATIENT)
Dept: BEHAVIORAL HEALTH | Facility: CLINIC | Age: 23
End: 2023-04-20
Payer: COMMERCIAL

## 2023-04-20 NOTE — TELEPHONE ENCOUNTER
Patient will need to get an appointment to establish care with a new PCP since Julio Cesar was his PCP.

## 2023-04-20 NOTE — TELEPHONE ENCOUNTER
PT IS CALLING BECAUSE HE NEEDS A REFILL OF FLUOXETINE 40MG. PT STATES HE IS TRYING TO GET SET UP WITH A NEW DR SINCE BO[facility]NA HAS LEFT THE OFFICE BUT IT IS NOT TIME FOR HIS APPOINTMENT YET AND HE NEEDS HIS MEDICATION REFILLED. PT WOULD LIKE TO KNOW HOW WE ARE HANDLING THIS AND PT WOULD LIKE TO MEDICATION TO BE SENT IN TO PHARMACY ON FILE IF POSSIBLE. PLEASE CALL THE PT TO ADVISE.

## 2023-04-20 NOTE — TELEPHONE ENCOUNTER
*HUB TO READ*    CALLED PT TO SCHEDULE EST CARE APPT WITH A PROVIDER IN THE OFFICE THAT IS ACCEPTING NEW PATIENTS SO HIS REFILL REQUEST CAN BE FORWARDED TO PCP. PT STATES HE WILL CALL BACK AT A LATER TIME TO SCHEDULE THAT APPOINTMENT.

## 2023-09-11 ENCOUNTER — OFFICE VISIT (OUTPATIENT)
Dept: INTERNAL MEDICINE | Facility: CLINIC | Age: 23
End: 2023-09-11
Payer: COMMERCIAL

## 2023-09-11 ENCOUNTER — LAB (OUTPATIENT)
Dept: LAB | Facility: HOSPITAL | Age: 23
End: 2023-09-11
Payer: COMMERCIAL

## 2023-09-11 VITALS
WEIGHT: 237 LBS | SYSTOLIC BLOOD PRESSURE: 124 MMHG | BODY MASS INDEX: 33.93 KG/M2 | HEART RATE: 79 BPM | OXYGEN SATURATION: 97 % | HEIGHT: 70 IN | DIASTOLIC BLOOD PRESSURE: 78 MMHG

## 2023-09-11 DIAGNOSIS — Z78.9 NON-SMOKER: ICD-10-CM

## 2023-09-11 DIAGNOSIS — E66.09 CLASS 1 OBESITY DUE TO EXCESS CALORIES WITHOUT SERIOUS COMORBIDITY WITH BODY MASS INDEX (BMI) OF 34.0 TO 34.9 IN ADULT: ICD-10-CM

## 2023-09-11 DIAGNOSIS — G43.109 MIGRAINE WITH AURA AND WITHOUT STATUS MIGRAINOSUS, NOT INTRACTABLE: ICD-10-CM

## 2023-09-11 DIAGNOSIS — Z76.89 ESTABLISHING CARE WITH NEW DOCTOR, ENCOUNTER FOR: Primary | ICD-10-CM

## 2023-09-11 DIAGNOSIS — Z78.9 NO SIGNIFICANT FAMILY HISTORY: ICD-10-CM

## 2023-09-11 DIAGNOSIS — Z13.220 SCREENING FOR LIPID DISORDERS: ICD-10-CM

## 2023-09-11 DIAGNOSIS — Z13.31 DEPRESSION SCREEN: ICD-10-CM

## 2023-09-11 DIAGNOSIS — Z23 NEED FOR INFLUENZA VACCINATION: ICD-10-CM

## 2023-09-11 DIAGNOSIS — Z71.3 ENCOUNTER FOR DIETARY COUNSELING AND SURVEILLANCE: ICD-10-CM

## 2023-09-11 PROCEDURE — 96127 BRIEF EMOTIONAL/BEHAV ASSMT: CPT | Performed by: NURSE PRACTITIONER

## 2023-09-11 PROCEDURE — 85027 COMPLETE CBC AUTOMATED: CPT

## 2023-09-11 PROCEDURE — 99214 OFFICE O/P EST MOD 30 MIN: CPT | Performed by: NURSE PRACTITIONER

## 2023-09-11 PROCEDURE — 80053 COMPREHEN METABOLIC PANEL: CPT

## 2023-09-11 PROCEDURE — 80061 LIPID PANEL: CPT

## 2023-09-11 PROCEDURE — 81003 URINALYSIS AUTO W/O SCOPE: CPT

## 2023-09-11 PROCEDURE — 90686 IIV4 VACC NO PRSV 0.5 ML IM: CPT | Performed by: NURSE PRACTITIONER

## 2023-09-11 PROCEDURE — 90471 IMMUNIZATION ADMIN: CPT | Performed by: NURSE PRACTITIONER

## 2023-09-11 NOTE — PROGRESS NOTES
"    Office Note     Name: Justice Pierce    : 2000     MRN: [id number]     Chief Complaint  Establish Care and Headache    Subjective     History of Present Illness:  Justice Pierce is a 22 y.o. male who presents today for establish care with new provider    Patient would like his flu shot today    Patient is not currently on any medication.    He does have an upcoming appointment with behavioral health as he previously saw Ciro with our office.  He is working to get back on Prozac for depression and anxiety symptoms.    Patient does have a noted history of migraines with aura.  He states he has had this for many years.  This is nothing new.  The headaches are frequent.  He is sensitive to light.  He does not feel like he is able to focus when this happens.  He the location is mainly on the sides of his head and in the middle.  He has noticed some changes to memory.  His headaches will last all day and happen about 3 to 4 days of a 7-day week.  He does take ibuprofen but does not feel it helps much    Denies any significant family history    Patient does not smoke.  No significant alcohol intake or drug use    Patient reports his diet is unhealthy.  No particular exercise pattern    Patient has had 3 of the COVID vaccines.  He would like his flu shot today.  Last tetanus shot was .  No one has ever discussed HPV vaccination series with him        Past Medical History:   Diagnosis Date    Allergic        History reviewed. No pertinent surgical history.    Social History     Socioeconomic History    Marital status: Single   Tobacco Use    Smoking status: Never    Smokeless tobacco: Never   Substance and Sexual Activity    Alcohol use: Never    Drug use: Never    Sexual activity: Not Currently       No current outpatient medications on file.    Objective     Vital Signs  /78   Pulse 79   Ht 177.8 cm (70\")   Wt 108 kg (237 lb)   SpO2 97%   BMI 34.01 kg/m²   Estimated body mass index is 34.01 kg/m² " "as calculated from the following:    Height as of this encounter: 177.8 cm (70\").    Weight as of this encounter: 108 kg (237 lb).    BMI is >= 30 and <35. (Class 1 Obesity). The following options were offered after discussion;: exercise counseling/recommendations and nutrition counseling/recommendations      PHQ-9 Depression Screening  Little interest or pleasure in doing things? 0-->not at all   Feeling down, depressed, or hopeless? 0-->not at all   Trouble falling or staying asleep, or sleeping too much?     Feeling tired or having little energy?     Poor appetite or overeating?     Feeling bad about yourself - or that you are a failure or have let yourself or your family down?     Trouble concentrating on things, such as reading the newspaper or watching television?     Moving or speaking so slowly that other people could have noticed? Or the opposite - being so fidgety or restless that you have been moving around a lot more than usual?     Thoughts that you would be better off dead, or of hurting yourself in some way?     PHQ-9 Total Score 0   If you checked off any problems, how difficult have these problems made it for you to do your work, take care of things at home, or get along with other people?       PHQ-9 Total Score: 0           Physical Exam  Vitals and nursing note reviewed.   Constitutional:       General: He is awake.      Appearance: Normal appearance. He is well-groomed. He is obese.   HENT:      Head: Normocephalic and atraumatic.      Right Ear: Hearing and external ear normal.      Left Ear: Hearing and external ear normal.      Nose: Nose normal.      Mouth/Throat:      Lips: Pink.      Mouth: Mucous membranes are moist.   Eyes:      Extraocular Movements: Extraocular movements intact.      Pupils: Pupils are equal, round, and reactive to light.   Cardiovascular:      Rate and Rhythm: Normal rate and regular rhythm.      Pulses: Normal pulses.           Radial pulses are 2+ on the right side " and 2+ on the left side.        Posterior tibial pulses are 2+ on the right side and 2+ on the left side.      Heart sounds: Normal heart sounds, S1 normal and S2 normal.   Pulmonary:      Effort: Pulmonary effort is normal.      Breath sounds: Normal breath sounds.   Abdominal:      General: Abdomen is flat. Bowel sounds are normal.      Palpations: Abdomen is soft.      Tenderness: There is no abdominal tenderness.   Musculoskeletal:         General: Normal range of motion.      Right lower leg: No edema.      Left lower leg: No edema.   Skin:     General: Skin is warm and dry.   Neurological:      Mental Status: He is alert and oriented to person, place, and time.   Psychiatric:         Mood and Affect: Mood normal.         Behavior: Behavior normal. Behavior is cooperative.         Thought Content: Thought content normal.         Judgment: Judgment normal.          Assessment and Plan     Diagnoses and all orders for this visit:    1. Establishing care with new doctor, encounter for (Primary)    2. Migraine with aura and without status migrainosus, not intractable  -     CBC (No Diff); Future  -     Comprehensive Metabolic Panel; Future  -     Urinalysis With Culture If Indicated -; Future    3. Screening for lipid disorders  -     Lipid Panel; Future    4. No significant family history    5. Non-smoker    6. Class 1 obesity due to excess calories without serious comorbidity with body mass index (BMI) of 34.0 to 34.9 in adult    7. Encounter for dietary counseling and surveillance    8. Depression screen    9. Need for influenza vaccination  -     Fluzone >6 Months    Plan  Establish care visit was completed with patient today    Patient was provided samples of both Ubrelvy and Nurtec as well as information and coupon cards.  Nurtec x2 samples.  Lot #[id number].  Expires 8/31/2025.  NDC [address]-5.  Same information on both boxes  Ubrelvy 100 mg.  2 box samples with same information.  NDC 0514-6266-73.  9/2025  expiration date.  Lot #[id number]  Recommended for patient that if these medications work well for him to consider calling insurance to check with coverage    Labs were ordered and will be obtained today.  Patient will be notified of results    Continue with non-smoking status    Consider healthy lifestyle    Flu shot was provided today    Go to ER if any condition worsens or severe    Keep upcoming appointment with behavioral health    Plan to follow-up in 2 to 4 weeks for annual physical exam and follow-up on headaches    Follow Up  Return for 2-4 weeks for annual visit and follow up on migraines and medication.    NONA Colvin    Part of this note may be an electronic transcription/translation of spoken language to printed text using the Dragon Dictation System.

## 2023-09-12 ENCOUNTER — TELEPHONE (OUTPATIENT)
Dept: INTERNAL MEDICINE | Facility: CLINIC | Age: 23
End: 2023-09-12
Payer: COMMERCIAL

## 2023-09-12 LAB
ALBUMIN SERPL-MCNC: 4.4 G/DL (ref 3.5–5.2)
ALBUMIN/GLOB SERPL: 1.4 G/DL
ALP SERPL-CCNC: 63 U/L (ref 39–117)
ALT SERPL W P-5'-P-CCNC: 26 U/L (ref 1–41)
ANION GAP SERPL CALCULATED.3IONS-SCNC: 12 MMOL/L (ref 5–15)
AST SERPL-CCNC: 34 U/L (ref 1–40)
BILIRUB SERPL-MCNC: 0.7 MG/DL (ref 0–1.2)
BILIRUB UR QL STRIP: NEGATIVE
BUN SERPL-MCNC: 10 MG/DL (ref 6–20)
BUN/CREAT SERPL: 7.1 (ref 7–25)
CALCIUM SPEC-SCNC: 9.5 MG/DL (ref 8.6–10.5)
CHLORIDE SERPL-SCNC: 104 MMOL/L (ref 98–107)
CHOLEST SERPL-MCNC: 156 MG/DL (ref 0–200)
CLARITY UR: CLEAR
CO2 SERPL-SCNC: 23 MMOL/L (ref 22–29)
COLOR UR: YELLOW
CREAT SERPL-MCNC: 1.41 MG/DL (ref 0.76–1.27)
DEPRECATED RDW RBC AUTO: 40.6 FL (ref 37–54)
EGFRCR SERPLBLD CKD-EPI 2021: 72.7 ML/MIN/1.73
ERYTHROCYTE [DISTWIDTH] IN BLOOD BY AUTOMATED COUNT: 12.6 % (ref 12.3–15.4)
GLOBULIN UR ELPH-MCNC: 3.2 GM/DL
GLUCOSE SERPL-MCNC: 75 MG/DL (ref 65–99)
GLUCOSE UR STRIP-MCNC: NEGATIVE MG/DL
HCT VFR BLD AUTO: 46.8 % (ref 37.5–51)
HDLC SERPL-MCNC: 30 MG/DL (ref 40–60)
HGB BLD-MCNC: 16 G/DL (ref 13–17.7)
HGB UR QL STRIP.AUTO: NEGATIVE
KETONES UR QL STRIP: NEGATIVE
LDLC SERPL CALC-MCNC: 103 MG/DL (ref 0–100)
LDLC/HDLC SERPL: 3.35 {RATIO}
LEUKOCYTE ESTERASE UR QL STRIP.AUTO: NEGATIVE
MCH RBC QN AUTO: 30 PG (ref 26.6–33)
MCHC RBC AUTO-ENTMCNC: 34.2 G/DL (ref 31.5–35.7)
MCV RBC AUTO: 87.6 FL (ref 79–97)
NITRITE UR QL STRIP: NEGATIVE
PH UR STRIP.AUTO: 6 [PH] (ref 5–8)
PLATELET # BLD AUTO: 441 10*3/MM3 (ref 140–450)
PMV BLD AUTO: 9 FL (ref 6–12)
POTASSIUM SERPL-SCNC: 4.1 MMOL/L (ref 3.5–5.2)
PROT SERPL-MCNC: 7.6 G/DL (ref 6–8.5)
PROT UR QL STRIP: NEGATIVE
RBC # BLD AUTO: 5.34 10*6/MM3 (ref 4.14–5.8)
SODIUM SERPL-SCNC: 139 MMOL/L (ref 136–145)
SP GR UR STRIP: 1.02 (ref 1–1.03)
TRIGL SERPL-MCNC: 127 MG/DL (ref 0–150)
UROBILINOGEN UR QL STRIP: NORMAL
VLDLC SERPL-MCNC: 23 MG/DL (ref 5–40)
WBC NRBC COR # BLD: 8.46 10*3/MM3 (ref 3.4–10.8)

## 2023-09-12 NOTE — TELEPHONE ENCOUNTER
----- Message from NONA Colvin sent at 9/12/2023  8:43 AM EDT -----  Please let patient know labs resulted  Urinalysis was negative  No evidence of infection or anemia on CBC  Only minor abnormalities on lipid panel.  I would highly encourage to research a healthy low-fat diet and add in regularly structured exercise  Your creatinine which is related to your kidneys was slightly elevated on your comprehensive metabolic panel.  I would increase your fluid intake to about 80 to 100 ounces of water per day.  You have a follow-up on October 5 and I will likely recheck this level at that time

## 2023-09-18 ENCOUNTER — OFFICE VISIT (OUTPATIENT)
Dept: BEHAVIORAL HEALTH | Facility: CLINIC | Age: 23
End: 2023-09-18
Payer: COMMERCIAL

## 2023-09-18 VITALS
WEIGHT: 232 LBS | BODY MASS INDEX: 33.21 KG/M2 | SYSTOLIC BLOOD PRESSURE: 114 MMHG | HEIGHT: 70 IN | DIASTOLIC BLOOD PRESSURE: 84 MMHG | OXYGEN SATURATION: 100 % | HEART RATE: 58 BPM

## 2023-09-18 DIAGNOSIS — F33.1 MODERATE EPISODE OF RECURRENT MAJOR DEPRESSIVE DISORDER: ICD-10-CM

## 2023-09-18 DIAGNOSIS — F41.1 GENERALIZED ANXIETY DISORDER: Primary | ICD-10-CM

## 2023-09-18 RX ORDER — FLUOXETINE HYDROCHLORIDE 20 MG/1
20 CAPSULE ORAL DAILY
Qty: 30 CAPSULE | Refills: 0 | Status: SHIPPED | OUTPATIENT
Start: 2023-09-18

## 2023-09-18 RX ORDER — PROPRANOLOL HYDROCHLORIDE 10 MG/1
10 TABLET ORAL AS NEEDED
COMMUNITY
End: 2023-09-18 | Stop reason: DRUGHIGH

## 2023-09-18 RX ORDER — PROPRANOLOL HYDROCHLORIDE 10 MG/1
10 TABLET ORAL AS NEEDED
Status: CANCELLED | OUTPATIENT
Start: 2023-09-18

## 2023-09-18 RX ORDER — PROPRANOLOL HYDROCHLORIDE 20 MG/1
20 TABLET ORAL 2 TIMES DAILY PRN
Qty: 60 TABLET | Refills: 0 | Status: SHIPPED | OUTPATIENT
Start: 2023-09-18

## 2023-09-18 NOTE — PROGRESS NOTES
New Patient Office Visit      Patient Name: Justice Pierce  : 2000   MRN: [id number]     Referring Provider: Jessica Reveles APRN    Chief Complaint:      ICD-10-CM ICD-9-CM   1. Generalized anxiety disorder  F41.1 300.02   2. Moderate episode of recurrent major depressive disorder  F33.1 296.32        History of Present Illness:   Justice Pierce is a 22 y.o. male who is here today for evaluation and medication management. He reports that he was on propranolol 10 mg as needed for anxiety in the past and sometimes took two. He reports that he was also on prozac 40 mg and his previous provider was discussing increasing his dose, but provider left and patient ran out of meds. Patient reports that he has had thoughts of not wanting to be here, but no plan or intent to hurt himself. Denies SI/HI/AVH.    Current Treatments: none  Medications: see medication record on chart  Therapy: 1 hour with Ciro biweekly, would like to do therapy again, exploring, refer to     Subjective      Review of Systems:   Review of Systems   Constitutional:  Negative for appetite change and unexpected weight change.   Eyes:  Negative for visual disturbance.   Respiratory:  Negative for chest tightness and shortness of breath.    Cardiovascular:  Negative for chest pain.   Musculoskeletal:  Negative for gait problem.   Skin:  Negative for rash and wound.   Neurological:  Negative for dizziness, tremors, seizures, weakness and light-headedness.   Psychiatric/Behavioral:  Positive for dysphoric mood and sleep disturbance. Negative for agitation, behavioral problems, confusion, decreased concentration, hallucinations, self-injury and suicidal ideas. The patient is nervous/anxious. The patient is not hyperactive.    Sleep pattern: poor, trouble falling asleep, napping during the day, at night 6 hours is max. impacts ability to get up and go, but is able to make himself  Appetite: trying to eat less, history of overeating, big  weight gain in last year, 10-20 lbs    Past Medical History:   Past Medical History:   Diagnosis Date    Allergic        Past Surgical History:   History reviewed. No pertinent surgical history.    Family History:   Family History   Problem Relation Age of Onset    Liver disease Mother     Cancer Mother     Migraines Father     Migraines Sister        Family Psychiatric History:  none    Screening Scores:   PHQ-9 : 21  THAO-7 : 13    Psychiatric History:     Previous medications: lexapro  Inpatient admissions: denies  History of suicide/self harm attempts: denies  Trauma/Abuse History: denies  Developmental History: on target    RISK ASSESSMENT:    Does patient have intent for suicide? deneis  Does patient have thoughts of suicide? denies  Does patient have a current plan for suicide? 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     Medications:     Current Outpatient Medications:     FLUoxetine (PROzac) 20 MG capsule, Take 1 capsule by mouth Daily., Disp: 30 capsule, Rfl: 0    propranolol (INDERAL) 20 MG tablet, Take 1 tablet by mouth 2 (Two) Times a Day As Needed (anxiety)., Disp: 60 tablet, Rfl: 0    Medication Considerations:  RICARDO reviewed and appropriate.      Allergies:   No Known Allergies    Objective     Physical Exam:  Vital Signs:   Vitals:    09/18/23 1550   BP:  "114/84   Pulse: 58   SpO2: 100%   Weight: 105 kg (232 lb)   Height: 177.8 cm (70\")     Body mass index is 33.29 kg/m².     Mental Status Exam:   Hygiene:   good  Cooperation:  Cooperative  Eye Contact:  Good  Psychomotor Behavior:  Appropriate  Affect:  Appropriate  Mood: normal  Speech:  Normal  Thought Process:  Goal directed and Linear  Thought Content:  Normal  Suicidal:  None  Homicidal:  None  Hallucinations:  None  Delusion:  None  Memory:  Intact  Orientation:  Person, Place, Time, and Situation  Reliability:  good  Insight:  Good  Judgement:  Good  Impulse Control:  Good  Physical/Medical Issues:  No      Assessment / Plan      Visit Diagnosis/Orders Placed This Visit:  Diagnoses and all orders for this visit:    1. Generalized anxiety disorder (Primary)  -     FLUoxetine (PROzac) 20 MG capsule; Take 1 capsule by mouth Daily.  Dispense: 30 capsule; Refill: 0  -     propranolol (INDERAL) 20 MG tablet; Take 1 tablet by mouth 2 (Two) Times a Day As Needed (anxiety).  Dispense: 60 tablet; Refill: 0  -     Ambulatory Referral to Behavioral Health    2. Moderate episode of recurrent major depressive disorder  -     FLUoxetine (PROzac) 20 MG capsule; Take 1 capsule by mouth Daily.  Dispense: 30 capsule; Refill: 0  -     Ambulatory Referral to Behavioral Health         Functional Status: No impairment    Prognosis: Good with Ongoing Treatment     Impression/Formulation:  Patient appeared alert and oriented.  Patient is voluntarily requesting to resume outpatient psychiatric treatment at [facility]. Patient is receptive to assistance with maintaining a stable lifestyle.  Patient presents with history of     ICD-10-CM ICD-9-CM   1. Generalized anxiety disorder  F41.1 300.02   2. Moderate episode of recurrent major depressive disorder  F33.1 296.32   Reviewed patient's previous provider notes. Reviewed most recent labs. Patient meets DSM V diagnostic criteria for diagnoses. Diagnoses may be " updated as more information becomes available.     Treatment Plan:   Begin prozac 20 mg daily, will likely increase at next appointment.   Continue propranolol 20 mg as needed for anxiety  Begin individual therapy  Follow up in 1 month or sooner if needed  Patient will continue supportive psychotherapy efforts and medications as indicated. Clinic will obtain release of information for current treatment team for continuity of care as needed. Patient will contact this office, call 911 or present to the nearest emergency room should suicidal or homicidal ideations occur. Discussed medication options and treatment plan of prescribed medication(s) as well as the risks, benefits, and potential side effects. Patient ackowledged and verbally consented to continue with current treatment plan and was educated on the importance of compliance with treatment and follow-up appointments.     Patient instructions:   Instructed will take 4-6 weeks for full therapeutic effect. Medication risks and side effects discussed with patient including risk for worsening mood, changes in behavior, thoughts of suicide or homicide, induction of kavita, serotonin syndrome.   If any thoughts of SI or HI, worsening mood or changes in behavior, call 911 or crisis line [phone], or go to nearest ER at once. Pt.verbalizes understanding and consents to treatment with this medication.     Follow Up:   Return in about 1 month (around 10/18/2023) for Med Check.        NONA Chaparro, PMHNP-BC [facility]

## 2023-10-05 ENCOUNTER — OFFICE VISIT (OUTPATIENT)
Dept: INTERNAL MEDICINE | Facility: CLINIC | Age: 23
End: 2023-10-05
Payer: COMMERCIAL

## 2023-10-05 VITALS
SYSTOLIC BLOOD PRESSURE: 128 MMHG | HEIGHT: 70 IN | BODY MASS INDEX: 32.78 KG/M2 | DIASTOLIC BLOOD PRESSURE: 76 MMHG | HEART RATE: 75 BPM | TEMPERATURE: 97.9 F | OXYGEN SATURATION: 98 % | WEIGHT: 229 LBS

## 2023-10-05 DIAGNOSIS — Z71.3 ENCOUNTER FOR DIETARY COUNSELING AND SURVEILLANCE: ICD-10-CM

## 2023-10-05 DIAGNOSIS — Z78.9 NON-SMOKER: ICD-10-CM

## 2023-10-05 DIAGNOSIS — F33.1 MODERATE EPISODE OF RECURRENT MAJOR DEPRESSIVE DISORDER: ICD-10-CM

## 2023-10-05 DIAGNOSIS — Z00.00 ANNUAL PHYSICAL EXAM: Primary | ICD-10-CM

## 2023-10-05 DIAGNOSIS — R79.89 ELEVATED SERUM CREATININE: ICD-10-CM

## 2023-10-05 DIAGNOSIS — E66.09 CLASS 1 OBESITY DUE TO EXCESS CALORIES WITH SERIOUS COMORBIDITY AND BODY MASS INDEX (BMI) OF 32.0 TO 32.9 IN ADULT: ICD-10-CM

## 2023-10-05 DIAGNOSIS — Z00.00 ENCOUNTER FOR WELL ADULT EXAM WITHOUT ABNORMAL FINDINGS: ICD-10-CM

## 2023-10-05 DIAGNOSIS — Z23 NEED FOR VACCINATION: ICD-10-CM

## 2023-10-05 DIAGNOSIS — F41.1 GENERALIZED ANXIETY DISORDER: ICD-10-CM

## 2023-10-05 DIAGNOSIS — G43.109 MIGRAINE WITH AURA AND WITHOUT STATUS MIGRAINOSUS, NOT INTRACTABLE: ICD-10-CM

## 2023-10-05 DIAGNOSIS — E78.00 ELEVATED LDL CHOLESTEROL LEVEL: ICD-10-CM

## 2023-10-05 DIAGNOSIS — Z78.9 NO SIGNIFICANT FAMILY HISTORY: ICD-10-CM

## 2023-10-05 PROCEDURE — 99395 PREV VISIT EST AGE 18-39: CPT | Performed by: NURSE PRACTITIONER

## 2023-10-05 NOTE — PROGRESS NOTES
Office Note     Name: Justice Pierce    : 2000     MRN: [id number]     Chief Complaint  Annual Exam, Anxiety, Depression (/), and migraines    Subjective     History of Present Illness:  Justiec Pierce is a 22 y.o. male who presents today for annual physical exam    Patient is not currently on any medication at this time    Patient had an appointment with Lianna concerning depression and anxiety symptoms    Patient is a history of migraines with aura.  He has had them for many years.  Headaches are frequent.  Sensitive to light.  He does not feel like he is able to focus when this happens.  The location is mainly on the sides of his head and in the middle.  He has noticed some changes to memory.  His headaches will last all day and happen about 3 to 4 days out of a 7-day week.  He does take ibuprofen but this does not help much    Denies any significant family history    Patient is a non-smoker.  No significant alcohol intake or drug use    At previous visit he reported his diet is unhealthy with no particular exercise pattern    Flu shot provided at previous visit.  He has had 3 COVID vaccines.  Last tetanus shot was .  Has not had HPV vaccinations. Interested in this series    Creatinine was noted to be slightly elevated on previous lab work.  There is elevated LDL cholesterol and low HDL cholesterol    The patient is being seen for a health maintenance evaluation.    Past Medical History:   Diagnosis Date    Allergic        History reviewed. No pertinent surgical history.    Social History     Socioeconomic History    Marital status: Single   Tobacco Use    Smoking status: Never    Smokeless tobacco: Never   Substance and Sexual Activity    Alcohol use: Never    Drug use: Never    Sexual activity: Not Currently         Current Outpatient Medications:     FLUoxetine (PROzac) 20 MG capsule, Take 1 capsule by mouth Daily., Disp: 30 capsule, Rfl: 0    propranolol (INDERAL) 20 MG tablet, Take 1 tablet  "by mouth 2 (Two) Times a Day As Needed (anxiety)., Disp: 60 tablet, Rfl: 0    General History  Justice  does have regular dental visits.  He does not complain of vision problems. Last eye exam was none recent  Immunizations are not up to date. The patient needs the following immunizations: flu shot at previous visit. Tetanus 2014. Has received a few covid vaccines. Interested in the HPV series    Lifestyle  Justice  consumes a in general, an \"unhealthy\" diet.  He exercises intermittently.    Reproductive Health  Justice  is sexually active. His contraceptive plan is no method stated.   He does not have erectile dysfunction.     Screening  Last PSA was none recent.  Last prostate exam was none recent. Family history of prostate cancer: None stated  Last testicular exam was none recent.   Last colonoscopy was never  Last Completed Colonoscopy       This patient has no relevant Health Maintenance data.        . Family history of colon cancer: None stated  Other pertinent family history and/or screenings: None at this time    Advance Care Planning   ACP discussion was held with the patient during this visit. Patient does not have an advance directive, declines further assistance.       Objective     Vital Signs  /76   Pulse 75   Temp 97.9 °F (36.6 °C)   Ht 177.8 cm (70\")   Wt 104 kg (229 lb)   SpO2 98%   BMI 32.86 kg/m²   Estimated body mass index is 32.86 kg/m² as calculated from the following:    Height as of this encounter: 177.8 cm (70\").    Weight as of this encounter: 104 kg (229 lb).            Physical Exam  Vitals and nursing note reviewed.   Constitutional:       General: He is awake.      Appearance: Normal appearance. He is well-groomed. He is obese.   HENT:      Head: Normocephalic and atraumatic.      Right Ear: Hearing and external ear normal.      Left Ear: Hearing and external ear normal.      Nose: Nose normal.      Mouth/Throat:      Lips: Pink.      Mouth: Mucous membranes are moist.   Eyes:      " Extraocular Movements: Extraocular movements intact.      Pupils: Pupils are equal, round, and reactive to light.   Cardiovascular:      Rate and Rhythm: Normal rate and regular rhythm.      Pulses: Normal pulses.           Radial pulses are 2+ on the right side and 2+ on the left side.        Posterior tibial pulses are 2+ on the right side and 2+ on the left side.      Heart sounds: Normal heart sounds, S1 normal and S2 normal.   Pulmonary:      Effort: Pulmonary effort is normal.      Breath sounds: Normal breath sounds.   Abdominal:      General: Abdomen is flat. Bowel sounds are normal.      Palpations: Abdomen is soft.      Tenderness: There is no abdominal tenderness.   Musculoskeletal:         General: Normal range of motion.      Right lower leg: No edema.      Left lower leg: No edema.   Skin:     General: Skin is warm and dry.   Neurological:      Mental Status: He is alert and oriented to person, place, and time.   Psychiatric:         Mood and Affect: Mood normal.         Behavior: Behavior normal. Behavior is cooperative.         Thought Content: Thought content normal.         Judgment: Judgment normal.               Assessment and Plan     Diagnoses and all orders for this visit:    1. Annual physical exam (Primary)    2. Encounter for well adult exam without abnormal findings    3. Migraine with aura and without status migrainosus, not intractable    4. Generalized anxiety disorder    5. Moderate episode of recurrent major depressive disorder    6. No significant family history    7. Non-smoker    8. Elevated LDL cholesterol level    9. Elevated serum creatinine  -     Basic Metabolic Panel; Future    10. Encounter for dietary counseling and surveillance    11. Class 1 obesity due to excess calories with serious comorbidity and body mass index (BMI) of 32.0 to 32.9 in adult    Plan  Annual physical exam completed with patient today    He did not need any refills of medications    Stay up-to-date  with all specialist including dentist and Lianna    Continue non-smoking status    Consider healthy diet and lifestyle    We will recheck BMP as creatinine was noted to be slightly elevated    HPV vaccine was provided to patient today    Go to ER if any condition worsens or severe    Follow-up 6 months for chronic conditions and second HPV vaccine    Follow-up 1 year for annual physical exam    Follow Up  Return for 6m for chronic care and hpv vaccine and labs and 1 year for annual .    NONA Colvin      Part of this note may be an electronic transcription/translation of spoken language to printed text using the Dragon Dictation System.

## 2023-10-05 NOTE — LETTER
[facility]  Vaccine Consent Form    Patient Name:  Justice Pierce  Patient :  2000     Vaccine(s) Ordered    HPV Vaccine (HPV9)        Screening Checklist  The following questions should be completed prior to vaccination. If you answer “yes” to any question, it does not necessarily mean you should not be vaccinated. It just means we may need to clarify or ask more questions. If a question is unclear, please ask your healthcare provider to explain it.    Yes No   Any fever or moderate to severe illness today (mild illness and/or antibiotic treatment are not contraindications)?     Do you have a history of a serious reaction to any previous vaccinations, such as anaphylaxis, encephalopathy within 7 days, Guillain-[place] syndrome within 6 weeks, seizure?     Have you received any live vaccine(s) in the past month (MMR, JD)?     Do you have an anaphylactic allergy to latex (DTaP, DTaP-IPV, Hep A, Hep B, MenB, RV, Td, Tdap), baker’s yeast (Hep B, HPV), or gelatin (JD, MMR)?     Do you have an anaphylactic allergy to neomycin (Rabies, JD, MMR, IPV, Hep A), polymyxin B (IPV), or streptomycin (IPV)?      Any cancer, leukemia, AIDS, or other immune system disorder? (JD, MMR, RV)     Do you have a parent, brother, or sister with an immune system problem (if immune competence of vaccine recipient clinically verified, can proceed)? (MMR, JD)     Any recent steroid treatments for >2 weeks, chemotherapy, or radiation treatment? (JD, MMR)     Have you received antibody-containing blood transfusions or IVIG in the past 11 months (recommended interval is dependent on product)? (MMR, JD)     Have you taken antiviral drugs (acyclovir, famciclovir, valacyclovir) in the last 24 or 48 hours, respectively (JD)?      Are you pregnant or planning to become pregnant within 1 month? (JD, MMR, HPV, IPV, MenB; For hep B- refer to Engerix-B)     For infants, have you ever been told your child has had intussusception or a  medical emergency involving obstruction of the intestine (RV)? If not for an infant, can skip this question.         *Ordering Physician/APC should be consulted if “yes” is checked by the patient or guardian above.      I have received, read, and understand the Vaccine Information Statement (VIS) for each vaccine ordered above.  I have considered my health status as well as the health status of my close contacts.  I have taken the opportunity to discuss my vaccine questions with my health care provider.   I have requested that the ordered vaccine(s) be given to me.  I understand the benefits and risks of the vaccines.  I understand that I should remain in the clinic for 15 minutes after receiving the vaccine(s).  _________________________________________________________  Signature of Patient or Parent/Legal Guardian ____________________  Date

## 2023-10-12 ENCOUNTER — LAB (OUTPATIENT)
Dept: LAB | Facility: HOSPITAL | Age: 23
End: 2023-10-12
Payer: COMMERCIAL

## 2023-10-12 DIAGNOSIS — R79.89 ELEVATED SERUM CREATININE: ICD-10-CM

## 2023-10-12 LAB
ANION GAP SERPL CALCULATED.3IONS-SCNC: 11 MMOL/L (ref 5–15)
BUN SERPL-MCNC: 15 MG/DL (ref 6–20)
BUN/CREAT SERPL: 10.1 (ref 7–25)
CALCIUM SPEC-SCNC: 10.1 MG/DL (ref 8.6–10.5)
CHLORIDE SERPL-SCNC: 102 MMOL/L (ref 98–107)
CO2 SERPL-SCNC: 26 MMOL/L (ref 22–29)
CREAT SERPL-MCNC: 1.49 MG/DL (ref 0.76–1.27)
EGFRCR SERPLBLD CKD-EPI 2021: 68.1 ML/MIN/1.73
GLUCOSE SERPL-MCNC: 88 MG/DL (ref 65–99)
POTASSIUM SERPL-SCNC: 4.2 MMOL/L (ref 3.5–5.2)
SODIUM SERPL-SCNC: 139 MMOL/L (ref 136–145)

## 2023-10-12 PROCEDURE — 80048 BASIC METABOLIC PNL TOTAL CA: CPT

## 2023-10-13 ENCOUNTER — TELEPHONE (OUTPATIENT)
Dept: INTERNAL MEDICINE | Facility: CLINIC | Age: 23
End: 2023-10-13
Payer: COMMERCIAL

## 2023-10-13 NOTE — TELEPHONE ENCOUNTER
Attempted to call pt to discuss results, no answer LM for him to return call.     HUB OK TO RELAY RESULTS

## 2023-10-13 NOTE — TELEPHONE ENCOUNTER
----- Message from NONA Colvin sent at 10/13/2023  8:59 AM EDT -----  Please let patient know labs resulted.  Your creatinine was still elevated at 1.49.  I would like to recommend that we move forward with an ultrasound of your kidneys.  Would you be open to me lacing this order?

## 2023-10-16 ENCOUNTER — TELEPHONE (OUTPATIENT)
Dept: INTERNAL MEDICINE | Facility: CLINIC | Age: 23
End: 2023-10-16
Payer: COMMERCIAL

## 2023-10-17 ENCOUNTER — TELEPHONE (OUTPATIENT)
Dept: INTERNAL MEDICINE | Facility: CLINIC | Age: 23
End: 2023-10-17
Payer: COMMERCIAL

## 2023-10-17 DIAGNOSIS — R79.89 ELEVATED SERUM CREATININE: Primary | ICD-10-CM

## 2023-10-17 NOTE — TELEPHONE ENCOUNTER
Pt called jet back regarding his elevated creatine and chandrakant wanting to order US of kidneys.      Call pt ok to leave detailed message  [phone]

## 2023-10-17 NOTE — TELEPHONE ENCOUNTER
HUB to relay message, please give message below.    Per Jessica Revelse, NONA   Please let patient know labs resulted.  Your creatinine was still elevated at 1.49.  I would like to recommend that we move forward with an ultrasound of your kidneys.  Would you be open to me lacing this order?

## 2023-10-17 NOTE — TELEPHONE ENCOUNTER
Name: Justice Pierce  Relationship: Self  Best Callback Number: [phone]  HUB PROVIDED THE RELAY MESSAGE FROM THE OFFICE  PATIENT VOICED UNDERSTANDING AND HAS NO FURTHER QUESTIONS AT THIS TIME  PATIENT AGREED TO HAVING THE ULTRASOUND ORDERS PLACED.   ADDITIONAL INFORMATION:

## 2023-11-08 ENCOUNTER — HOSPITAL ENCOUNTER (OUTPATIENT)
Dept: ULTRASOUND IMAGING | Facility: HOSPITAL | Age: 23
Discharge: HOME | End: 2023-11-08
Admitting: NURSE PRACTITIONER
Payer: COMMERCIAL

## 2023-11-08 DIAGNOSIS — R79.89 ELEVATED SERUM CREATININE: ICD-10-CM

## 2023-11-08 PROCEDURE — 76775 US EXAM ABDO BACK WALL LIM: CPT

## 2023-11-09 ENCOUNTER — TELEPHONE (OUTPATIENT)
Dept: INTERNAL MEDICINE | Facility: CLINIC | Age: 23
End: 2023-11-09
Payer: COMMERCIAL

## 2023-11-09 NOTE — TELEPHONE ENCOUNTER
----- Message from NONA Colvin sent at 11/9/2023 10:16 AM EST -----  Please let patient know ultrasound of the kidneys resulted.  Resulted as unremarkable kidney ultrasound.  No mass in the kidney noted.  No evidence of hydronephrosis or a kidney stone.

## 2024-09-23 ENCOUNTER — APPOINTMENT (OUTPATIENT)
Dept: CT IMAGING | Facility: HOSPITAL | Age: 24
End: 2024-09-23
Payer: COMMERCIAL

## 2024-09-23 ENCOUNTER — HOSPITAL ENCOUNTER (EMERGENCY)
Facility: HOSPITAL | Age: 24
Discharge: HOME | End: 2024-09-23
Attending: EMERGENCY MEDICINE | Admitting: EMERGENCY MEDICINE
Payer: COMMERCIAL

## 2024-09-23 ENCOUNTER — APPOINTMENT (OUTPATIENT)
Dept: GENERAL RADIOLOGY | Facility: HOSPITAL | Age: 24
End: 2024-09-23
Payer: COMMERCIAL

## 2024-09-23 VITALS
RESPIRATION RATE: 16 BRPM | HEART RATE: 91 BPM | DIASTOLIC BLOOD PRESSURE: 86 MMHG | TEMPERATURE: 99.1 F | HEIGHT: 70 IN | WEIGHT: 240 LBS | OXYGEN SATURATION: 92 % | BODY MASS INDEX: 34.36 KG/M2 | SYSTOLIC BLOOD PRESSURE: 156 MMHG

## 2024-09-23 DIAGNOSIS — R79.89 ELEVATED SERUM CREATININE: ICD-10-CM

## 2024-09-23 DIAGNOSIS — R07.81 PLEURITIC PAIN: Primary | ICD-10-CM

## 2024-09-23 LAB
ALBUMIN SERPL-MCNC: 4.5 G/DL (ref 3.5–5.2)
ALBUMIN/GLOB SERPL: 1.4 G/DL
ALP SERPL-CCNC: 77 U/L (ref 39–117)
ALT SERPL W P-5'-P-CCNC: 27 U/L (ref 1–41)
ANION GAP SERPL CALCULATED.3IONS-SCNC: 12 MMOL/L (ref 5–15)
AST SERPL-CCNC: 25 U/L (ref 1–40)
BASOPHILS # BLD AUTO: 0.11 10*3/MM3 (ref 0–0.2)
BASOPHILS NFR BLD AUTO: 0.7 % (ref 0–1.5)
BILIRUB SERPL-MCNC: 0.3 MG/DL (ref 0–1.2)
BILIRUB UR QL STRIP: NEGATIVE
BUN SERPL-MCNC: 13 MG/DL (ref 6–20)
BUN/CREAT SERPL: 9.2 (ref 7–25)
CALCIUM SPEC-SCNC: 9.3 MG/DL (ref 8.6–10.5)
CHLORIDE SERPL-SCNC: 100 MMOL/L (ref 98–107)
CLARITY UR: CLEAR
CO2 SERPL-SCNC: 24 MMOL/L (ref 22–29)
COLOR UR: YELLOW
CREAT SERPL-MCNC: 1.42 MG/DL (ref 0.76–1.27)
D DIMER PPP FEU-MCNC: <0.27 MCGFEU/ML (ref 0–0.5)
DEPRECATED RDW RBC AUTO: 37.7 FL (ref 37–54)
EGFRCR SERPLBLD CKD-EPI 2021: 71.6 ML/MIN/1.73
EOSINOPHIL # BLD AUTO: 0.87 10*3/MM3 (ref 0–0.4)
EOSINOPHIL NFR BLD AUTO: 5.8 % (ref 0.3–6.2)
ERYTHROCYTE [DISTWIDTH] IN BLOOD BY AUTOMATED COUNT: 12.1 % (ref 12.3–15.4)
FLUAV RNA RESP QL NAA+PROBE: NOT DETECTED
FLUBV RNA RESP QL NAA+PROBE: NOT DETECTED
GLOBULIN UR ELPH-MCNC: 3.3 GM/DL
GLUCOSE SERPL-MCNC: 107 MG/DL (ref 65–99)
GLUCOSE UR STRIP-MCNC: NEGATIVE MG/DL
HCT VFR BLD AUTO: 48.7 % (ref 37.5–51)
HGB BLD-MCNC: 16.9 G/DL (ref 13–17.7)
HGB UR QL STRIP.AUTO: NEGATIVE
IMM GRANULOCYTES # BLD AUTO: 0.05 10*3/MM3 (ref 0–0.05)
IMM GRANULOCYTES NFR BLD AUTO: 0.3 % (ref 0–0.5)
KETONES UR QL STRIP: ABNORMAL
LEUKOCYTE ESTERASE UR QL STRIP.AUTO: NEGATIVE
LIPASE SERPL-CCNC: 40 U/L (ref 13–60)
LYMPHOCYTES # BLD AUTO: 3.76 10*3/MM3 (ref 0.7–3.1)
LYMPHOCYTES NFR BLD AUTO: 24.9 % (ref 19.6–45.3)
MCH RBC QN AUTO: 29.6 PG (ref 26.6–33)
MCHC RBC AUTO-ENTMCNC: 34.7 G/DL (ref 31.5–35.7)
MCV RBC AUTO: 85.3 FL (ref 79–97)
MONOCYTES # BLD AUTO: 0.99 10*3/MM3 (ref 0.1–0.9)
MONOCYTES NFR BLD AUTO: 6.6 % (ref 5–12)
NEUTROPHILS NFR BLD AUTO: 61.7 % (ref 42.7–76)
NEUTROPHILS NFR BLD AUTO: 9.32 10*3/MM3 (ref 1.7–7)
NITRITE UR QL STRIP: NEGATIVE
NRBC BLD AUTO-RTO: 0 /100 WBC (ref 0–0.2)
PH UR STRIP.AUTO: 5.5 [PH] (ref 5–8)
PLATELET # BLD AUTO: 422 10*3/MM3 (ref 140–450)
PMV BLD AUTO: 8.3 FL (ref 6–12)
POTASSIUM SERPL-SCNC: 3.5 MMOL/L (ref 3.5–5.2)
PROT SERPL-MCNC: 7.8 G/DL (ref 6–8.5)
PROT UR QL STRIP: ABNORMAL
QT INTERVAL: 358 MS
QTC INTERVAL: 430 MS
RBC # BLD AUTO: 5.71 10*6/MM3 (ref 4.14–5.8)
SARS-COV-2 RNA RESP QL NAA+PROBE: NOT DETECTED
SODIUM SERPL-SCNC: 136 MMOL/L (ref 136–145)
SP GR UR STRIP: 1.03 (ref 1–1.03)
TROPONIN T SERPL HS-MCNC: <6 NG/L
UROBILINOGEN UR QL STRIP: ABNORMAL
WBC NRBC COR # BLD AUTO: 15.1 10*3/MM3 (ref 3.4–10.8)

## 2024-09-23 PROCEDURE — 25010000002 KETOROLAC TROMETHAMINE PER 15 MG: Performed by: PHYSICIAN ASSISTANT

## 2024-09-23 PROCEDURE — 84484 ASSAY OF TROPONIN QUANT: CPT | Performed by: PHYSICIAN ASSISTANT

## 2024-09-23 PROCEDURE — 93005 ELECTROCARDIOGRAM TRACING: CPT | Performed by: PHYSICIAN ASSISTANT

## 2024-09-23 PROCEDURE — 99284 EMERGENCY DEPT VISIT MOD MDM: CPT

## 2024-09-23 PROCEDURE — 83690 ASSAY OF LIPASE: CPT | Performed by: PHYSICIAN ASSISTANT

## 2024-09-23 PROCEDURE — 87636 SARSCOV2 & INF A&B AMP PRB: CPT | Performed by: PHYSICIAN ASSISTANT

## 2024-09-23 PROCEDURE — 85379 FIBRIN DEGRADATION QUANT: CPT | Performed by: PHYSICIAN ASSISTANT

## 2024-09-23 PROCEDURE — 96374 THER/PROPH/DIAG INJ IV PUSH: CPT

## 2024-09-23 PROCEDURE — 71045 X-RAY EXAM CHEST 1 VIEW: CPT

## 2024-09-23 PROCEDURE — 85025 COMPLETE CBC W/AUTO DIFF WBC: CPT | Performed by: PHYSICIAN ASSISTANT

## 2024-09-23 PROCEDURE — 71250 CT THORAX DX C-: CPT

## 2024-09-23 PROCEDURE — 81003 URINALYSIS AUTO W/O SCOPE: CPT | Performed by: PHYSICIAN ASSISTANT

## 2024-09-23 PROCEDURE — 80053 COMPREHEN METABOLIC PANEL: CPT | Performed by: PHYSICIAN ASSISTANT

## 2024-09-23 RX ORDER — KETOROLAC TROMETHAMINE 15 MG/ML
15 INJECTION, SOLUTION INTRAMUSCULAR; INTRAVENOUS ONCE
Status: COMPLETED | OUTPATIENT
Start: 2024-09-23 | End: 2024-09-23

## 2024-09-23 RX ORDER — SODIUM CHLORIDE 0.9 % (FLUSH) 0.9 %
10 SYRINGE (ML) INJECTION AS NEEDED
Status: DISCONTINUED | OUTPATIENT
Start: 2024-09-23 | End: 2024-09-23 | Stop reason: HOSPADM

## 2024-09-23 RX ADMIN — KETOROLAC TROMETHAMINE 15 MG: 15 INJECTION, SOLUTION INTRAMUSCULAR; INTRAVENOUS at 08:32

## 2025-07-08 ENCOUNTER — TELEPHONE (OUTPATIENT)
Dept: INTERNAL MEDICINE | Facility: CLINIC | Age: 25
End: 2025-07-08
Payer: COMMERCIAL

## 2025-07-08 NOTE — TELEPHONE ENCOUNTER
HUB TO RELAY -   Patient was last seen on 10/5/2023. Called patient to see if he needed to be schedule or if a new PCP was found. No answer, left voicemail for call back. If patient calls back please schedule or remove PCP.